# Patient Record
Sex: FEMALE | Race: WHITE | NOT HISPANIC OR LATINO | Employment: PART TIME | URBAN - NONMETROPOLITAN AREA
[De-identification: names, ages, dates, MRNs, and addresses within clinical notes are randomized per-mention and may not be internally consistent; named-entity substitution may affect disease eponyms.]

---
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
## 2020-01-17 ENCOUNTER — HOSPITAL ENCOUNTER (OUTPATIENT)
Dept: OTHER | Facility: HOSPITAL | Age: 20
Discharge: HOME | End: 2020-01-17
Attending: NURSE PRACTITIONER

## 2020-01-17 ENCOUNTER — OFFICE VISIT CONVERTED (OUTPATIENT)
Dept: FAMILY MEDICINE CLINIC | Age: 20
End: 2020-01-17
Attending: NURSE PRACTITIONER

## 2020-01-19 LAB — BACTERIA SPEC AEROBE CULT: NORMAL

## 2020-03-04 ENCOUNTER — OFFICE VISIT CONVERTED (OUTPATIENT)
Dept: OTOLARYNGOLOGY | Facility: CLINIC | Age: 20
End: 2020-03-04
Attending: OTOLARYNGOLOGY

## 2020-03-04 ENCOUNTER — CONVERSION ENCOUNTER (OUTPATIENT)
Dept: OTOLARYNGOLOGY | Facility: CLINIC | Age: 20
End: 2020-03-04

## 2020-09-15 ENCOUNTER — OFFICE VISIT CONVERTED (OUTPATIENT)
Dept: FAMILY MEDICINE CLINIC | Age: 20
End: 2020-09-15
Attending: NURSE PRACTITIONER

## 2020-09-15 ENCOUNTER — HOSPITAL ENCOUNTER (OUTPATIENT)
Dept: OTHER | Facility: HOSPITAL | Age: 20
Discharge: HOME | End: 2020-09-15
Attending: NURSE PRACTITIONER

## 2020-09-16 ENCOUNTER — APPOINTMENT (OUTPATIENT)
Dept: ULTRASOUND IMAGING | Facility: HOSPITAL | Age: 20
End: 2020-09-16

## 2020-09-16 ENCOUNTER — HOSPITAL ENCOUNTER (EMERGENCY)
Facility: HOSPITAL | Age: 20
Discharge: HOME | End: 2020-09-16
Attending: EMERGENCY MEDICINE | Admitting: EMERGENCY MEDICINE

## 2020-09-16 VITALS
OXYGEN SATURATION: 99 % | HEIGHT: 71 IN | SYSTOLIC BLOOD PRESSURE: 131 MMHG | RESPIRATION RATE: 16 BRPM | HEART RATE: 88 BPM | DIASTOLIC BLOOD PRESSURE: 92 MMHG | TEMPERATURE: 97.9 F

## 2020-09-16 DIAGNOSIS — R10.13 EPIGASTRIC ABDOMINAL PAIN: Primary | ICD-10-CM

## 2020-09-16 DIAGNOSIS — K29.00 ACUTE GASTRITIS WITHOUT HEMORRHAGE, UNSPECIFIED GASTRITIS TYPE: ICD-10-CM

## 2020-09-16 DIAGNOSIS — R93.89 ABNORMAL FINDING ON IMAGING: ICD-10-CM

## 2020-09-16 LAB
ALBUMIN SERPL-MCNC: 4.3 G/DL (ref 3.5–5.2)
ALBUMIN/GLOB SERPL: 1.5 G/DL
ALP SERPL-CCNC: 75 U/L (ref 39–117)
ALT SERPL W P-5'-P-CCNC: 14 U/L (ref 1–33)
ANION GAP SERPL CALCULATED.3IONS-SCNC: 8.7 MMOL/L (ref 5–15)
AST SERPL-CCNC: 18 U/L (ref 1–32)
BASOPHILS # BLD AUTO: 0.05 10*3/MM3 (ref 0–0.2)
BASOPHILS NFR BLD AUTO: 0.5 % (ref 0–1.5)
BILIRUB SERPL-MCNC: 0.4 MG/DL (ref 0–1.2)
BILIRUB UR QL STRIP: NEGATIVE
BUN SERPL-MCNC: 14 MG/DL (ref 6–20)
BUN/CREAT SERPL: 18.7 (ref 7–25)
CALCIUM SPEC-SCNC: 9.2 MG/DL (ref 8.6–10.5)
CHLORIDE SERPL-SCNC: 101 MMOL/L (ref 98–107)
CLARITY UR: CLEAR
CO2 SERPL-SCNC: 25.3 MMOL/L (ref 22–29)
COLOR UR: YELLOW
CREAT SERPL-MCNC: 0.75 MG/DL (ref 0.57–1)
DEPRECATED RDW RBC AUTO: 38.2 FL (ref 37–54)
EOSINOPHIL # BLD AUTO: 0.11 10*3/MM3 (ref 0–0.4)
EOSINOPHIL NFR BLD AUTO: 1 % (ref 0.3–6.2)
ERYTHROCYTE [DISTWIDTH] IN BLOOD BY AUTOMATED COUNT: 12.2 % (ref 12.3–15.4)
GFR SERPL CREATININE-BSD FRML MDRD: 99 ML/MIN/1.73
GLOBULIN UR ELPH-MCNC: 2.9 GM/DL
GLUCOSE SERPL-MCNC: 94 MG/DL (ref 65–99)
GLUCOSE UR STRIP-MCNC: NEGATIVE MG/DL
HCG SERPL QL: NEGATIVE
HCT VFR BLD AUTO: 40.5 % (ref 34–46.6)
HGB BLD-MCNC: 13.4 G/DL (ref 12–15.9)
HGB UR QL STRIP.AUTO: NEGATIVE
IMM GRANULOCYTES # BLD AUTO: 0.02 10*3/MM3 (ref 0–0.05)
IMM GRANULOCYTES NFR BLD AUTO: 0.2 % (ref 0–0.5)
KETONES UR QL STRIP: ABNORMAL
LEUKOCYTE ESTERASE UR QL STRIP.AUTO: NEGATIVE
LIPASE SERPL-CCNC: 12 U/L (ref 13–60)
LYMPHOCYTES # BLD AUTO: 2.04 10*3/MM3 (ref 0.7–3.1)
LYMPHOCYTES NFR BLD AUTO: 18.8 % (ref 19.6–45.3)
MCH RBC QN AUTO: 28.8 PG (ref 26.6–33)
MCHC RBC AUTO-ENTMCNC: 33.1 G/DL (ref 31.5–35.7)
MCV RBC AUTO: 87.1 FL (ref 79–97)
MONOCYTES # BLD AUTO: 0.51 10*3/MM3 (ref 0.1–0.9)
MONOCYTES NFR BLD AUTO: 4.7 % (ref 5–12)
NEUTROPHILS NFR BLD AUTO: 74.8 % (ref 42.7–76)
NEUTROPHILS NFR BLD AUTO: 8.1 10*3/MM3 (ref 1.7–7)
NITRITE UR QL STRIP: NEGATIVE
NRBC BLD AUTO-RTO: 0 /100 WBC (ref 0–0.2)
PH UR STRIP.AUTO: 5.5 [PH] (ref 5–8)
PLATELET # BLD AUTO: 225 10*3/MM3 (ref 140–450)
PMV BLD AUTO: 9.9 FL (ref 6–12)
POTASSIUM SERPL-SCNC: 3.8 MMOL/L (ref 3.5–5.2)
PROT SERPL-MCNC: 7.2 G/DL (ref 6–8.5)
PROT UR QL STRIP: NEGATIVE
RBC # BLD AUTO: 4.65 10*6/MM3 (ref 3.77–5.28)
SODIUM SERPL-SCNC: 135 MMOL/L (ref 136–145)
SP GR UR STRIP: 1.01 (ref 1–1.03)
UROBILINOGEN UR QL STRIP: ABNORMAL
WBC # BLD AUTO: 10.83 10*3/MM3 (ref 3.4–10.8)

## 2020-09-16 PROCEDURE — 76705 ECHO EXAM OF ABDOMEN: CPT

## 2020-09-16 PROCEDURE — 80053 COMPREHEN METABOLIC PANEL: CPT | Performed by: NURSE PRACTITIONER

## 2020-09-16 PROCEDURE — 85025 COMPLETE CBC W/AUTO DIFF WBC: CPT | Performed by: NURSE PRACTITIONER

## 2020-09-16 PROCEDURE — 81003 URINALYSIS AUTO W/O SCOPE: CPT | Performed by: NURSE PRACTITIONER

## 2020-09-16 PROCEDURE — 99284 EMERGENCY DEPT VISIT MOD MDM: CPT

## 2020-09-16 PROCEDURE — 83690 ASSAY OF LIPASE: CPT | Performed by: NURSE PRACTITIONER

## 2020-09-16 PROCEDURE — 84703 CHORIONIC GONADOTROPIN ASSAY: CPT | Performed by: NURSE PRACTITIONER

## 2020-09-16 RX ORDER — OMEPRAZOLE 40 MG/1
40 CAPSULE, DELAYED RELEASE ORAL DAILY
Qty: 30 CAPSULE | Refills: 0 | Status: SHIPPED | OUTPATIENT
Start: 2020-09-16 | End: 2020-10-16

## 2020-09-16 RX ORDER — SUCRALFATE 1 G/1
1 TABLET ORAL 4 TIMES DAILY
Qty: 120 TABLET | Refills: 0 | Status: SHIPPED | OUTPATIENT
Start: 2020-09-16 | End: 2020-10-16

## 2020-09-16 RX ORDER — ALUMINA, MAGNESIA, AND SIMETHICONE 2400; 2400; 240 MG/30ML; MG/30ML; MG/30ML
15 SUSPENSION ORAL ONCE
Status: COMPLETED | OUTPATIENT
Start: 2020-09-16 | End: 2020-09-16

## 2020-09-16 RX ORDER — SODIUM CHLORIDE 0.9 % (FLUSH) 0.9 %
10 SYRINGE (ML) INJECTION AS NEEDED
Status: DISCONTINUED | OUTPATIENT
Start: 2020-09-16 | End: 2020-09-16 | Stop reason: HOSPADM

## 2020-09-16 RX ORDER — LIDOCAINE HYDROCHLORIDE 20 MG/ML
15 SOLUTION OROPHARYNGEAL ONCE
Status: COMPLETED | OUTPATIENT
Start: 2020-09-16 | End: 2020-09-16

## 2020-09-16 RX ADMIN — ALUMINUM HYDROXIDE, MAGNESIUM HYDROXIDE, AND DIMETHICONE 15 ML: 400; 400; 40 SUSPENSION ORAL at 07:40

## 2020-09-16 RX ADMIN — LIDOCAINE HYDROCHLORIDE 15 ML: 20 SOLUTION ORAL; TOPICAL at 07:40

## 2020-09-16 NOTE — ED PROVIDER NOTES
Subjective   20-year-old female with no significant past medical history here for chief complaint of epigastric abdominal pain.  History was obtained from the patient.  The patient states that her pain started 2 weeks ago.  She states that a week ago it did get better.  She states that then it came back.  She states that she has nonbilious nonbloody vomiting.  Patient denies any radiation of pain.  Patient denies any diarrhea.  She denies any history of abdominal surgeries.  Patient denies any urinary symptoms.  She denies any vaginal bleeding or discharge.  Patient denies any chest pain or shortness of breath.  She denies any fevers or chills.  Patient states that she does have acid reflux.  She states that she has been drinking a lot of Coke and coffee that she has cut back on.  Patient states that for the past week she is only been drinking water.  She is currently on birth control.  Patient denies any swelling in her legs.          Review of Systems   All other systems reviewed and are negative.      History reviewed. No pertinent past medical history.    No Known Allergies    History reviewed. No pertinent surgical history.    History reviewed. No pertinent family history.    Social History     Socioeconomic History   • Marital status: Single     Spouse name: Not on file   • Number of children: Not on file   • Years of education: Not on file   • Highest education level: Not on file           Objective   Physical Exam  Vitals signs reviewed.   Constitutional:       General: She is not in acute distress.     Appearance: She is well-developed. She is not ill-appearing, toxic-appearing or diaphoretic.   HENT:      Head: Normocephalic and atraumatic.      Mouth/Throat:      Mouth: Mucous membranes are moist.      Pharynx: Oropharynx is clear. No pharyngeal swelling or oropharyngeal exudate.   Eyes:      General: No scleral icterus.     Extraocular Movements: Extraocular movements intact.      Pupils: Pupils are  equal, round, and reactive to light.   Cardiovascular:      Rate and Rhythm: Normal rate and regular rhythm.      Heart sounds: Normal heart sounds. No murmur. No friction rub. No gallop.    Pulmonary:      Effort: Pulmonary effort is normal. No respiratory distress.      Breath sounds: Normal breath sounds. No stridor. No wheezing, rhonchi or rales.   Chest:      Chest wall: No tenderness.   Abdominal:      General: Abdomen is flat. Bowel sounds are normal. There is no distension. There are no signs of injury.      Palpations: Abdomen is soft. There is no shifting dullness, fluid wave, hepatomegaly, splenomegaly, mass or pulsatile mass.      Tenderness: There is no abdominal tenderness. There is no right CVA tenderness, left CVA tenderness, guarding or rebound. Negative signs include Ferreira's sign, Rovsing's sign, McBurney's sign, psoas sign and obturator sign.      Hernia: No hernia is present.      Comments: No right lower quadrant abdominal pain, no left lower quadrant abdominal pain   Skin:     General: Skin is warm and dry.      Capillary Refill: Capillary refill takes less than 2 seconds.      Coloration: Skin is not cyanotic, jaundiced, mottled or pale.      Findings: No erythema or rash.   Neurological:      General: No focal deficit present.      Mental Status: She is alert and oriented to person, place, and time.      Cranial Nerves: No cranial nerve deficit.      Motor: No weakness.   Psychiatric:         Mood and Affect: Mood normal.         Procedures           ED Course  ED Course as of Sep 16 1706   Wed Sep 16, 2020   0659 Discussed patient with Dr. Lopez.     [EW]   0708 Discussed patient with Toshia in US.  Pt with minimal gallbladder sludge, otherwise unremarkable.  She also has incidental 1.3 cm hypoechoic area in left lobe of liver.     [EW]   0732 Discussed GB ultrasound with Dr. Angel.  Minimal sludge.  Left liver lesion is 12x7 mm, recommends follow up ultrasound in 3-4 months.   Could be  adenoma due to OCP.     [EW]   0736 I updated patient and her father on GB ultrasound results and specifically mentioned the left upper liver lobe lesion and the need for follow-up.  Will d/c her with increased dose of carafate, omeprazole and GI follow up.     [EW]   0801 Liver enzymes are normal, pregnancy negative, lipase normal.      [EW]      ED Course User Index  [EW] Cristiano Goldie Jasmin, APRN                                           MDM  Number of Diagnoses or Management Options  Abnormal finding on imaging:   Acute gastritis without hemorrhage, unspecified gastritis type:   Epigastric abdominal pain:      Amount and/or Complexity of Data Reviewed  Clinical lab tests: ordered and reviewed  Tests in the radiology section of CPT®: ordered and reviewed    Risk of Complications, Morbidity, and/or Mortality  General comments: When I first saw the patient, the patient appeared nontoxic.  IV was started and labs were obtained.  Patient's labs were grossly unremarkable.  At this point, given that she was having epigastric abdominal pain, we decided to get a right upper quadrant ultrasound.  This was negative for an acute gallbladder pathology.  Patient did have minimal sludge and incidental finding on the liver lobe.  The patient was made aware of this.  She was told that she will need repeat imaging to follow this up.  Patient had negative Ferreira sign on my physical exam.  Patient did not have any jaundice on my physical exam.  I suspicion for acute cholecystitis is low.  Patient's lipase was negative making pancreatitis less likely.  I think appendicitis is less likely given that the patient had no right lower quadrant abdominal pain and her symptoms did resolve and have been going on and off for approximately 2 weeks.  We did talk about a CT abdomen pelvis and a joint decision making was applied given that the patient's abdominal exam was so benign and the risks of radiation that the patient decided that she would  want a wait on the CT scan today.  She did understand the risks and benefits of getting the CT scan.  She had capacity to make decisions and decided to wait.  Patient was given a GI cocktail in the emergency department.  I think GYN pathology is less likely given that the patient had no bilateral lower quadrant pain and no complaints of vaginal bleeding or discharge.  I think pyelonephritis or kidney stone is less likely given that the patient had negative urine.  At this point, I think patient symptoms could be secondary to acid reflux.  Patient was given a prescription for omeprazole, was told to increase her dose of Carafate, and was told to follow-up with GI as an outpatient.  Patient was given strict return precautions including any abdominal pain, nausea, vomiting, blood in her stool, blood in her vomit, fevers, or any other concerning symptoms.  Patient expressed understanding and was glad to be discharged home.  All questions were answered prior to discharge.    Patient Progress  Patient progress: stable      Final diagnoses:   None            Kashif Lopez MD  09/16/20 5870

## 2020-09-16 NOTE — DISCHARGE INSTRUCTIONS
Incidental Liver Nodule    Your imaging study today revealed an incidental finding of one or more nodules in your liver.  These do not appear to be related to your medical complaint today, however the finding does warrant further evaluation.  The exact cause and significance of the nodule(s) is unknown at this amarilis, however the possible diagnoses do include infections, tumors, and/or cancers.  The fact that you are being discharged today implies that I feel serious complication is unlikely, however additional testing that is not availabe in the ER may be required.      Please follow up with your Primary Care Provider and make sure they are aware of this finding so that he/she can schedule the appropriate follow-up testing.      It has been recommended you have follow up US in 3-4 months to evaluate resolution    Although you are being discharged from the ED today, I encourage you to return for worsening symptoms. Things can, and do, change such that treatment at home with medication may not be adequate. Specifically I recommend returning for chest pain or discomfort, difficulty breathing, persistent vomiting or difficulty holding down liquids or medications, fever > 102.0 F, severe abdominal pain, or any other worsening or alarming symptoms.     Rest. Drink plenty of fluids.  Follow up with PCP or provider listed for further evaluation and management.  Follow up with primary care provider for further management and to have blood pressure rechecked.  Take all medications as prescribed.

## 2020-09-16 NOTE — ED NOTES
Patient was placed in face mask in first look.  Patient was wearing a face mask throughout our encounter.  I wore protective eye protection throughout the encounter.  Hand hygiene was performed before and after patient encounter.        Armani Weaver RN  09/16/20 0775

## 2020-09-16 NOTE — ED TRIAGE NOTES
Pt reports upper abdominal pains rating 4/10 at the moment. Pt reports that the pains are intermittent. Pt states that she has had nausea and fatigue as well with her current symptoms.     Pt placed in mask and staff wearing appropriate ppe at the time of pt arrival.

## 2020-09-16 NOTE — ED PROVIDER NOTES
EMERGENCY DEPARTMENT ENCOUNTER    Room Number:  06/06  Date seen:  9/16/2020  Time seen: 06:12 EDT  PCP: Elayne Sprague APRN  Historian: patient    HPI:  Chief complaint:RUQ pain, n/v  A complete HPI/ROS/PMH/PSH/SH/FH are unobtainable due to: n/a  Context:Lin Jimenez is a 20 y.o. female who presents to the ED with c/o 1 week intermittent RUQ pain described as aching which changes to sharp pain with nausea.  It is made only slightly better with Pepto bismol but not worse by anything. The pain radiates to her right back.  Pain has been present for the past week and she has been drinking only water and eating soup/toast.  She came to ER this am because she had n/v when she had the pain.  She saw PCP yesterday who dx as ulcer and began her on Carafate BID.  She has no fever/chills.  No h/o abdominal surgeries.  She is on BCP and her LMP was about 3 weeks ago.  She reports similar symptoms one month ago that resolved and now it has returned.     Patient was placed in face mask in first look. Patient was wearing facemask when I entered the room and throughout our encounter. I wore full protective equipment throughout this patient encounter including a face mask, eye shield and gloves. Hand hygiene/washing of hands was performed before donning protective equipment and after removal when leaving the room.    MEDICAL RECORD REVIEW    ALLERGIES  Patient has no known allergies.    PAST MEDICAL HISTORY  Active Ambulatory Problems     Diagnosis Date Noted   • No Active Ambulatory Problems     Resolved Ambulatory Problems     Diagnosis Date Noted   • No Resolved Ambulatory Problems     No Additional Past Medical History       PAST SURGICAL HISTORY  History reviewed. No pertinent surgical history.    FAMILY HISTORY  History reviewed. No pertinent family history.    SOCIAL HISTORY  Social History     Socioeconomic History   • Marital status: Single     Spouse name: Not on file   • Number of children: Not on file   • Years of  education: Not on file   • Highest education level: Not on file       REVIEW OF SYSTEMS  Review of Systems    All systems reviewed and negative except for those discussed in HPI.     PHYSICAL EXAM    ED Triage Vitals [09/16/20 0601]   Temp Heart Rate Resp BP SpO2   97.9 °F (36.6 °C) 102 15 -- 99 %      Temp src Heart Rate Source Patient Position BP Location FiO2 (%)   Tympanic Monitor -- -- --     Physical Exam    I have reviewed the triage vital signs and nursing notes.      GENERAL: not distressed, calm, pleasant, cooperative  HENT: nares patent, mm moist  EYES: no scleral icterus  NECK: no ROM limitations  CV: regular rhythm, regular rate, no murmur, no rubs, no gallups  RESPIRATORY: normal effort, CTAB  ABDOMEN: soft, flat.  Minimal RUQ and epigastric tenderness, no rebound or guarding.  Bowel sounds normal  : deferred  MUSCULOSKELETAL: no deformity  NEURO: alert, moves all extremities, follows commands  SKIN: warm, dry    LAB RESULTS  Recent Results (from the past 24 hour(s))   Comprehensive Metabolic Panel    Collection Time: 09/16/20  7:09 AM    Specimen: Blood   Result Value Ref Range    Glucose 94 65 - 99 mg/dL    BUN 14 6 - 20 mg/dL    Creatinine 0.75 0.57 - 1.00 mg/dL    Sodium 135 (L) 136 - 145 mmol/L    Potassium 3.8 3.5 - 5.2 mmol/L    Chloride 101 98 - 107 mmol/L    CO2 25.3 22.0 - 29.0 mmol/L    Calcium 9.2 8.6 - 10.5 mg/dL    Total Protein 7.2 6.0 - 8.5 g/dL    Albumin 4.30 3.50 - 5.20 g/dL    ALT (SGPT) 14 1 - 33 U/L    AST (SGOT) 18 1 - 32 U/L    Alkaline Phosphatase 75 39 - 117 U/L    Total Bilirubin 0.4 0.0 - 1.2 mg/dL    eGFR Non African Amer 99 >60 mL/min/1.73    Globulin 2.9 gm/dL    A/G Ratio 1.5 g/dL    BUN/Creatinine Ratio 18.7 7.0 - 25.0    Anion Gap 8.7 5.0 - 15.0 mmol/L   hCG, Serum, Qualitative    Collection Time: 09/16/20  7:09 AM    Specimen: Blood   Result Value Ref Range    HCG Qualitative Negative Negative   CBC Auto Differential    Collection Time: 09/16/20  7:09 AM     Specimen: Blood   Result Value Ref Range    WBC 10.83 (H) 3.40 - 10.80 10*3/mm3    RBC 4.65 3.77 - 5.28 10*6/mm3    Hemoglobin 13.4 12.0 - 15.9 g/dL    Hematocrit 40.5 34.0 - 46.6 %    MCV 87.1 79.0 - 97.0 fL    MCH 28.8 26.6 - 33.0 pg    MCHC 33.1 31.5 - 35.7 g/dL    RDW 12.2 (L) 12.3 - 15.4 %    RDW-SD 38.2 37.0 - 54.0 fl    MPV 9.9 6.0 - 12.0 fL    Platelets 225 140 - 450 10*3/mm3    Neutrophil % 74.8 42.7 - 76.0 %    Lymphocyte % 18.8 (L) 19.6 - 45.3 %    Monocyte % 4.7 (L) 5.0 - 12.0 %    Eosinophil % 1.0 0.3 - 6.2 %    Basophil % 0.5 0.0 - 1.5 %    Immature Grans % 0.2 0.0 - 0.5 %    Neutrophils, Absolute 8.10 (H) 1.70 - 7.00 10*3/mm3    Lymphocytes, Absolute 2.04 0.70 - 3.10 10*3/mm3    Monocytes, Absolute 0.51 0.10 - 0.90 10*3/mm3    Eosinophils, Absolute 0.11 0.00 - 0.40 10*3/mm3    Basophils, Absolute 0.05 0.00 - 0.20 10*3/mm3    Immature Grans, Absolute 0.02 0.00 - 0.05 10*3/mm3    nRBC 0.0 0.0 - 0.2 /100 WBC   Lipase    Collection Time: 09/16/20  7:09 AM    Specimen: Blood   Result Value Ref Range    Lipase 12 (L) 13 - 60 U/L   Urinalysis With Microscopic If Indicated (No Culture) - Urine, Clean Catch    Collection Time: 09/16/20  7:16 AM    Specimen: Urine, Clean Catch   Result Value Ref Range    Color, UA Yellow Yellow, Straw    Appearance, UA Clear Clear    pH, UA 5.5 5.0 - 8.0    Specific Gravity, UA 1.013 1.005 - 1.030    Glucose, UA Negative Negative    Ketones, UA Trace (A) Negative    Bilirubin, UA Negative Negative    Blood, UA Negative Negative    Protein, UA Negative Negative    Leuk Esterase, UA Negative Negative    Nitrite, UA Negative Negative    Urobilinogen, UA 0.2 E.U./dL 0.2 - 1.0 E.U./dL         RADIOLOGY RESULTS  US Gallbladder               PROGRESS, DATA ANALYSIS, CONSULTS AND MEDICAL DECISION MAKING  All labs have been independently reviewed by me.  All radiology studies have been reviewed by me and discussed with radiologist dictating the report.  EKG's independently viewed and  interpreted by me unless stated otherwise. Discussion below represents my analysis of pertinent findings related to patient's condition, differential diagnosis, treatment plan and final disposition.     ED Course as of Sep 16 0805   Wed Sep 16, 2020   0659 Discussed patient with Dr. Lopez.     [EW]   0708 Discussed patient with Toshia in US.  Pt with minimal gallbladder sludge, otherwise unremarkable.  She also has incidental 1.3 cm hypoechoic area in left lobe of liver.     [EW]   0732 Discussed GB ultrasound with Dr. Angel.  Minimal sludge.  Left liver lesion is 12x7 mm, recommends follow up ultrasound in 3-4 months.   Could be adenoma due to OCP.     [EW]   0736 I updated patient and her father on GB ultrasound results and specifically mentioned the left upper liver lobe lesion and the need for follow-up.  Will d/c her with increased dose of carafate, omeprazole and GI follow up.     [EW]   0801 Liver enzymes are normal, pregnancy negative, lipase normal.      [EW]      ED Course User Index  [EW] Goldie Quinonez, APRN     DDX: Differential diagnosis includes but is not limited to:  - hepatobiliary pathology such as cholecystitis, cholangitis, and symptomatic cholelithiasis  - Pancreatitis  - Dyspepsia  - Small bowel obstruction  - Appendicitis  - Diverticulitis  - UTI including pyelonephritis  - Ureteral stone  - Zoster  - Colitis, including infectious and ischemic  - Atypical ACS    MDM: Patient with normal labs.  Gallbladder ultrasound is essentially unremarkable.  I discussed with patient incidental finding of the liver abnormality and discussed follow-up with her.  I have given her GI follow-up, increased her Carafate to 4 times a day and started her on omeprazole.  Her abdominal exam is nonfocal and she appears well.    0700:Reviewed pt's history and workup with Dr. Lopez.  After a bedside evaluation, Dr. Lopez agrees with the plan of care.    The patient's history, physical exam, and lab findings were  "discussed with the physician, who also performed a face to face history and physical exam.  I discussed all results and noted any abnormalities with patient.  Discussed absoute need to recheck abnormalities with their family physician.  I answered any of the patient's questions.  Discussed plan for discharge, as there is no emergent indication for admission.  Pt is agreeable and understands need for follow up and repeat testing.  Pt is aware that discharge does not mean that nothing is wrong but it indicates no emergency is present and they must continue care with their family physician.  Pt is discharged with instructions to follow up with primary care doctor to have their blood pressure rechecked.           Disposition vitals:  /85   Pulse 88   Temp 97.9 °F (36.6 °C) (Tympanic)   Resp 18   Ht 180.3 cm (71\")   SpO2 99%       DIAGNOSIS  Final diagnoses:   Epigastric abdominal pain   Abnormal finding on imaging   Acute gastritis without hemorrhage, unspecified gastritis type       FOLLOW UP   Cornelio Ford MD  [address]  [place] [id number] [phone]    Schedule an appointment as soon as possible for a visit            Goldie Quinonez, TRACIE  09/16/20 0806    "

## 2020-09-17 ENCOUNTER — PREP FOR SURGERY (OUTPATIENT)
Dept: OTHER | Facility: HOSPITAL | Age: 20
End: 2020-09-17

## 2020-09-17 ENCOUNTER — OFFICE VISIT (OUTPATIENT)
Dept: GASTROENTEROLOGY | Facility: CLINIC | Age: 20
End: 2020-09-17

## 2020-09-17 VITALS
WEIGHT: 165 LBS | OXYGEN SATURATION: 99 % | TEMPERATURE: 98 F | SYSTOLIC BLOOD PRESSURE: 110 MMHG | HEIGHT: 71 IN | DIASTOLIC BLOOD PRESSURE: 78 MMHG | BODY MASS INDEX: 23.1 KG/M2 | HEART RATE: 89 BPM

## 2020-09-17 DIAGNOSIS — R11.0 NAUSEA: ICD-10-CM

## 2020-09-17 DIAGNOSIS — R10.11 RIGHT UPPER QUADRANT ABDOMINAL PAIN: ICD-10-CM

## 2020-09-17 DIAGNOSIS — R10.13 EPIGASTRIC PAIN: Primary | ICD-10-CM

## 2020-09-17 DIAGNOSIS — K76.9 LIVER LESION: ICD-10-CM

## 2020-09-17 PROCEDURE — 99204 OFFICE O/P NEW MOD 45 MIN: CPT | Performed by: INTERNAL MEDICINE

## 2020-09-17 RX ORDER — NORGESTREL AND ETHINYL ESTRADIOL 0.3-0.03MG
KIT ORAL
COMMUNITY
Start: 2020-09-03 | End: 2022-11-22

## 2020-09-17 NOTE — PATIENT INSTRUCTIONS
Schedule EGD for further evaluation of symptoms.    For surveillance of liver lesion, we will plan on ultrasound for monitoring December 2020.

## 2020-09-17 NOTE — PROGRESS NOTES
Abdominal Pain (For a month ), Vomiting, and Hospital Follow Up Visit      HPI  Patient here for consultation regarding epigastric pain and right upper quadrant pain.  She reported a 2-week history of abdominal pain associated with nonbilious, nonbloody emesis.  There is no reported diarrhea.  Patient had been complaining of acid reflux but no dysphagia.    She was recently in the emergency room and was sent home on omeprazole and Carafate.  She had an ultrasound in the ER which showed evidence of minimal gallbladder sludge and a 12 mm x 7 mm left hepatic lesion and a follow-up was recommended in 3 to 4 months. Lipase was normal.    Patient presents today with concerns about abdominal pain. Pain is present to epigastric area and RUQ. Pain has been present for months. At times, it is achy but it became sharp before her recent ER visit. When the pain is sharp, it radiates to her back. She also had vomiting associated with the pain. She has been following a bland diet.    She takes ibuprofen occasionally.     Denies prior GI surgeries. Her grandfather has Crohn's disease.    She was started on omeprazole and carafate which have helped her symptoms somewhat.    Review of Systems   Constitutional: Positive for appetite change and fatigue. Negative for chills, diaphoresis, fever and unexpected weight change.   HENT: Negative for dental problem, ear pain, mouth sores, rhinorrhea, sore throat and voice change.    Eyes: Negative for pain, redness and visual disturbance.   Respiratory: Negative for cough, chest tightness and wheezing.    Cardiovascular: Negative for chest pain, palpitations and leg swelling.   Endocrine: Negative for cold intolerance, heat intolerance, polydipsia, polyphagia and polyuria.   Genitourinary: Negative for dysuria, frequency, hematuria and urgency.   Musculoskeletal: Positive for back pain. Negative for arthralgias, joint swelling, myalgias and neck pain.   Skin: Negative for rash.    Allergic/Immunologic: Negative for environmental allergies, food allergies and immunocompromised state.   Neurological: Positive for weakness and headaches. Negative for dizziness, seizures and numbness.   Hematological: Does not bruise/bleed easily.   Psychiatric/Behavioral: Negative for sleep disturbance. The patient is not nervous/anxious.         I have reviewed and confirmed the accuracy of the HPI and ROS as documented by the APRN TRACIE Bartholomew     Problem List:  There is no problem list on file for this patient.      Medical History:  No past medical history on file.     Social History:    Social History     Socioeconomic History   • Marital status: Single     Spouse name: Not on file   • Number of children: Not on file   • Years of education: Not on file   • Highest education level: Not on file   Tobacco Use   • Smoking status: Never Smoker   • Smokeless tobacco: Never Used   Substance and Sexual Activity   • Alcohol use: Not Currently   • Drug use: Never   • Sexual activity: Defer       Family History:   Family History   Problem Relation Age of Onset   • Hypertension Neg Hx    • Diabetes Neg Hx    • Crohn's disease Neg Hx        Surgical History: No past surgical history on file.      Current Outpatient Medications:   •  Low-Ogestrel 0.3-30 MG-MCG per tablet, , Disp: , Rfl:   •  omeprazole (priLOSEC) 40 MG capsule, Take 1 capsule by mouth Daily for 30 days., Disp: 30 capsule, Rfl: 0  •  sucralfate (CARAFATE) 1 g tablet, Take 1 tablet by mouth 4 (Four) Times a Day for 30 days., Disp: 120 tablet, Rfl: 0    Allergies: No Known Allergies     The following portions of the patient's history were reviewed and updated as appropriate: allergies, current medications, past family history, past medical history, past social history, past surgical history and problem list.    Vitals:    09/17/20 1342   BP: 110/78   Pulse: 89   Temp: 98 °F (36.7 °C)   SpO2: 99%         09/17/20  1342   Weight: 74.8 kg (165 lb)      Body mass index is 23.02 kg/m².      PHYSICAL EXAM:  Physical Exam  Vitals signs reviewed.   Constitutional:       Appearance: Normal appearance. She is well-developed.   HENT:      Nose: Nose normal. No nasal deformity.   Eyes:      General: No scleral icterus.  Neck:      Trachea: No tracheal deviation.   Pulmonary:      Effort: Pulmonary effort is normal. No respiratory distress.      Breath sounds: Normal breath sounds.   Abdominal:      General: Bowel sounds are normal. There is no distension.      Palpations: Abdomen is soft. Abdomen is not rigid. There is no shifting dullness.      Tenderness: There is no abdominal tenderness. There is no guarding or rebound.      Hernia: No hernia is present.   Lymphadenopathy:      Comments: No periumbilical lymphadenopathy   Skin:     General: Skin is warm.   Neurological:      Mental Status: She is alert.   Psychiatric:         Behavior: Behavior normal.             Assessment/ Plan  Lin was seen today for abdominal pain, vomiting and hospital follow up visit.    Diagnoses and all orders for this visit:    Epigastric pain    Right upper quadrant abdominal pain    Nausea    Liver lesion         No follow-ups on file.    There are no Patient Instructions on file for this visit.        Discussion:  We will proceed with an EGD for evaluation of new and worsening epigastric pain associated with nausea.  We will take biopsies of the small bowel to rule out celiac disease.  In the meantime continue PPI and Carafate.  Plan repeat ultrasound of the liver in 3 months to assess stability of liver lesion.  Has enlarged, would consider dedicated CT or MRI.  Patient is going to talk to her gynecologist regarding decreased estrogen content in her oral contraceptives.    Documentation by Alyce HODGES acting as a scribe in the following sections on behalf of the billable provider: HPI, ROS, assessment, & plan.

## 2020-10-05 ENCOUNTER — LAB REQUISITION (OUTPATIENT)
Dept: LAB | Facility: HOSPITAL | Age: 20
End: 2020-10-05

## 2020-10-05 DIAGNOSIS — Z00.00 ENCOUNTER FOR GENERAL ADULT MEDICAL EXAMINATION WITHOUT ABNORMAL FINDINGS: ICD-10-CM

## 2020-10-05 PROCEDURE — U0004 COV-19 TEST NON-CDC HGH THRU: HCPCS | Performed by: INTERNAL MEDICINE

## 2020-10-06 LAB — SARS-COV-2 RNA RESP QL NAA+PROBE: NOT DETECTED

## 2020-10-09 ENCOUNTER — OUTSIDE FACILITY SERVICE (OUTPATIENT)
Dept: GASTROENTEROLOGY | Facility: CLINIC | Age: 20
End: 2020-10-09

## 2020-10-09 PROCEDURE — 43239 EGD BIOPSY SINGLE/MULTIPLE: CPT | Performed by: INTERNAL MEDICINE

## 2020-12-14 ENCOUNTER — APPOINTMENT (OUTPATIENT)
Dept: ULTRASOUND IMAGING | Facility: HOSPITAL | Age: 20
End: 2020-12-14

## 2020-12-26 NOTE — PROGRESS NOTES
Follow-up and Abdominal Pain (Pt stated the pains have subsided )      HPI  20-year-old female presents the office today for follow-up.  She was last seen in office on 9/17/2020.  She has a history of epigastric pain.  EGD on 10/14/2020 which was unremarkable, aside from some acute angulation in the post bulbar duodenum.  She was recommended to undergo small bowel follow-through for further evaluation, but it does not appear that this has been completed.  Stomach biopsy revealed mild reactive gastropathy.  Small bowel biopsy was unremarkable.    She reports having approximately 2 episodes of abdominal discomfort accompanied with nausea in the past 3 months.  She reports experiencing sharp pains near her epigastric/rib cage area, that at times make it difficult for her to take a deep breath.  The abdominal discomfort generally lasts 2-3 weeks in total duration.  She might have an uncomfortable type of feeling all day, but at worst pain is a 7/10 and is sharp intermittently.  She reports Pepto-Bismol helps at times.  Symptoms seem to be worse when she is lying down.  Between episodes, she does not have any of the symptoms.  She denies any weight loss.  She reports early satiety, which occurs on a regular basis.  Generally she is only able to eat a few bites of food at a time and feels full.  She is not diabetic.    She reports having regular daily bowel movements and denies have any lower abdominal pain, diarrhea, constipation, melena, or hematochezia.    Review of Systems   Constitutional: Negative for appetite change, chills, diaphoresis, fatigue, fever and unexpected weight change.   HENT: Negative for dental problem, ear pain, mouth sores, rhinorrhea, sore throat and voice change.    Eyes: Negative for pain, redness and visual disturbance.   Respiratory: Negative for cough, chest tightness and wheezing.    Cardiovascular: Negative for chest pain, palpitations and leg swelling.   Endocrine: Negative for cold  intolerance, heat intolerance, polydipsia, polyphagia and polyuria.   Genitourinary: Negative for dysuria, frequency, hematuria and urgency.   Musculoskeletal: Negative for arthralgias, back pain, joint swelling, myalgias and neck pain.   Skin: Negative for rash.   Allergic/Immunologic: Positive for environmental allergies. Negative for food allergies and immunocompromised state.   Neurological: Negative for dizziness, seizures, weakness, numbness and headaches.   Hematological: Does not bruise/bleed easily.   Psychiatric/Behavioral: Negative for sleep disturbance. The patient is not nervous/anxious.             Problem List:  There is no problem list on file for this patient.      Medical History:  History reviewed. No pertinent past medical history.     Social History:    Social History     Socioeconomic History   • Marital status: Single     Spouse name: Not on file   • Number of children: Not on file   • Years of education: Not on file   • Highest education level: Not on file   Tobacco Use   • Smoking status: Never Smoker   • Smokeless tobacco: Never Used   Substance and Sexual Activity   • Alcohol use: Not Currently   • Drug use: Never   • Sexual activity: Defer       Family History:   Family History   Problem Relation Age of Onset   • Hypertension Neg Hx    • Diabetes Neg Hx    • Crohn's disease Neg Hx        Surgical History: History reviewed. No pertinent surgical history.      Current Outpatient Medications:   •  Low-Ogestrel 0.3-30 MG-MCG per tablet, , Disp: , Rfl:   •  ondansetron ODT (ZOFRAN-ODT) 4 MG disintegrating tablet, Dissolve 1 tablet by mouth every 6 hours PRN nausea, vomiting, Disp: 30 tablet, Rfl: 1    Allergies: No Known Allergies     The following portions of the patient's history were reviewed and updated as appropriate: allergies, current medications, past family history, past medical history, past social history, past surgical history and problem list.    Vitals:    12/29/20 1323   BP:  118/64   Pulse: 80   Temp: 97.8 °F (36.6 °C)   SpO2: 98%       Physical Exam  Vitals signs reviewed.   Constitutional:       Appearance: Normal appearance.   Pulmonary:      Effort: Pulmonary effort is normal. No respiratory distress.   Abdominal:      General: Abdomen is flat. Bowel sounds are normal. There is no distension.      Palpations: Abdomen is soft.      Tenderness: There is no abdominal tenderness. There is no guarding or rebound.   Musculoskeletal: Normal range of motion.   Skin:     General: Skin is warm and dry.   Neurological:      General: No focal deficit present.      Mental Status: She is alert and oriented to person, place, and time.   Psychiatric:         Mood and Affect: Mood normal.         Behavior: Behavior normal.         Thought Content: Thought content normal.         Judgment: Judgment normal.         Assessment/ Plan  Diagnoses and all orders for this visit:    1. Abnormal findings on esophagogastroduodenoscopy (EGD) (Primary)  -     FL UGI Small Intestine Follow-through; Future    2. Epigastric pain  -     FL UGI Small Intestine Follow-through; Future    3. Nausea  -     FL UGI Small Intestine Follow-through; Future    4. Early satiety    Other orders  -     ondansetron ODT (ZOFRAN-ODT) 4 MG disintegrating tablet; Dissolve 1 tablet by mouth every 6 hours PRN nausea, vomiting  Dispense: 30 tablet; Refill: 1         Return for pending test results and symptoms.  1.  We will plan to proceed with small bowel follow-through for further evaluation of abnormal findings on EGD consisting of acute angulation of the post bulbar duodenum.    2.  For nausea, a prescription for Zofran 4 mg oral disintegrating tablets has been sent to your pharmacy.  You may take 1 tablet every 6 hours as needed for nausea.  Please note that Zofran can be constipating if taken at higher doses.    3.  Additional recommendations pending outcome of small bowel follow-through.      Discussion:  EGD and pathology report  reviewed with patient today during her office visit.  Due to findings on her EGD consisting of acute angulation of the post bulbar duodenum, we will proceed with small bowel follow-through for further evaluation for possible median arcuate ligament syndrome.  For nausea, we have prescribed Zofran.  If small bowel follow-through is unrevealing, to consider gastric emptying study for further evaluation of early satiety and nausea in the setting of intermittent abdominal discomfort.  Next office visit to be determined based upon imaging and patient symptoms.  Patient verbalized understanding of above plan of care.  All questions answered and support provided.

## 2020-12-29 ENCOUNTER — OFFICE VISIT (OUTPATIENT)
Dept: GASTROENTEROLOGY | Facility: CLINIC | Age: 20
End: 2020-12-29

## 2020-12-29 VITALS
SYSTOLIC BLOOD PRESSURE: 118 MMHG | HEIGHT: 71 IN | WEIGHT: 167.9 LBS | OXYGEN SATURATION: 98 % | HEART RATE: 80 BPM | DIASTOLIC BLOOD PRESSURE: 64 MMHG | BODY MASS INDEX: 23.51 KG/M2 | TEMPERATURE: 97.8 F

## 2020-12-29 DIAGNOSIS — R68.81 EARLY SATIETY: ICD-10-CM

## 2020-12-29 DIAGNOSIS — R19.8 ABNORMAL FINDINGS ON ESOPHAGOGASTRODUODENOSCOPY (EGD): Primary | ICD-10-CM

## 2020-12-29 DIAGNOSIS — R11.0 NAUSEA: ICD-10-CM

## 2020-12-29 DIAGNOSIS — R10.13 EPIGASTRIC PAIN: ICD-10-CM

## 2020-12-29 PROCEDURE — 99214 OFFICE O/P EST MOD 30 MIN: CPT | Performed by: NURSE PRACTITIONER

## 2020-12-29 RX ORDER — ONDANSETRON 4 MG/1
TABLET, ORALLY DISINTEGRATING ORAL
Qty: 30 TABLET | Refills: 1 | Status: SHIPPED | OUTPATIENT
Start: 2020-12-29 | End: 2022-06-06

## 2020-12-29 NOTE — PATIENT INSTRUCTIONS
1.  We will plan to proceed with small bowel follow-through for further evaluation of abnormal findings on EGD consisting of acute angulation of the post bulbar duodenum.    2.  For nausea, a prescription for Zofran 4 mg oral disintegrating tablets has been sent to your pharmacy.  You may take 1 tablet every 6 hours as needed for nausea.  Please note that Zofran can be constipating if taken at higher doses.    3.  Additional recommendations pending outcome of small bowel follow-through.

## 2020-12-31 ENCOUNTER — APPOINTMENT (OUTPATIENT)
Dept: ULTRASOUND IMAGING | Facility: HOSPITAL | Age: 20
End: 2020-12-31

## 2021-01-08 ENCOUNTER — HOSPITAL ENCOUNTER (OUTPATIENT)
Dept: OTHER | Facility: HOSPITAL | Age: 21
Discharge: HOME | End: 2021-01-08
Attending: FAMILY MEDICINE

## 2021-01-10 LAB — SARS-COV-2 RNA SPEC QL NAA+PROBE: NOT DETECTED

## 2021-04-15 ENCOUNTER — TRANSCRIBE ORDERS (OUTPATIENT)
Dept: ADMINISTRATIVE | Facility: HOSPITAL | Age: 21
End: 2021-04-15

## 2021-04-15 DIAGNOSIS — R10.9 ABDOMINAL PAIN, UNSPECIFIED ABDOMINAL LOCATION: Primary | ICD-10-CM

## 2021-04-15 DIAGNOSIS — K76.89 LIVER NODULE: ICD-10-CM

## 2021-04-20 ENCOUNTER — TELEPHONE (OUTPATIENT)
Dept: GASTROENTEROLOGY | Facility: CLINIC | Age: 21
End: 2021-04-20

## 2021-04-20 NOTE — TELEPHONE ENCOUNTER
Per verbal release, left detailed message about overdue small bowel follow through ordered by Petrona Mills.

## 2021-04-26 ENCOUNTER — HOSPITAL ENCOUNTER (OUTPATIENT)
Dept: ULTRASOUND IMAGING | Facility: HOSPITAL | Age: 21
Discharge: HOME | End: 2021-04-26
Admitting: OBSTETRICS & GYNECOLOGY

## 2021-04-26 DIAGNOSIS — R10.9 ABDOMINAL PAIN, UNSPECIFIED ABDOMINAL LOCATION: ICD-10-CM

## 2021-04-26 DIAGNOSIS — K76.89 LIVER NODULE: ICD-10-CM

## 2021-04-26 PROCEDURE — 76705 ECHO EXAM OF ABDOMEN: CPT

## 2021-05-15 VITALS
WEIGHT: 166.37 LBS | RESPIRATION RATE: 16 BRPM | TEMPERATURE: 97.8 F | BODY MASS INDEX: 23.29 KG/M2 | HEIGHT: 71 IN | HEART RATE: 90 BPM | OXYGEN SATURATION: 99 %

## 2021-05-18 NOTE — PROGRESS NOTES
Lin Jimenez  2000     Office/Outpatient Visit    Visit Date: Tue, Sep 15, 2020 11:24 am    Provider: Elayne Sprague N.P. (Assistant: Jeanine Purvis RN)    Location: [facility]        Electronically signed by Elayne Sprague N.P. on  09/15/2020 12:47:20 PM                             Subjective:        CC: Ms. Jimenez is a 20 year old female.  presents today due to Pain in stomach and acid reflux that is intermitten, the pain goes from stabbing to aching at times         HPI:           PHQ-9 Depression Screening: Completed form scanned and in chart; Total Score enter total score: 2           Unspecified abdominal pain details; this is located primarily in the epigastric region.  It began years ago.  The onset of pain occurred acts up more at night - wakes her up  out of sleep.  Aggravating factors include at night.  The pain is relieved with Peptobismol helps in the short term - having to take daily.  Associated symptoms include melena, nausea and severe heartburn.  She denies change in bowel habits, constipation, diarrhea or vomiting.  Habits include Drniks only socially  ;  does not take NSAIDs regularly.  Had an US about 2 years ago for similar symptoms,  GB was normal Had food last night 9 pm - had peptobismol at 11pm    ROS:     CONSTITUTIONAL:  Negative for chills and fever.      CARDIOVASCULAR:  Negative for chest pain and pedal edema.      RESPIRATORY:  Negative for recent cough and dyspnea.      GASTROINTESTINAL:  Positive for abdominal pain ( epigastric ), acid reflux symptoms, heartburn, melena ( noticed here recently ) and nausea.   Negative for abdominal bloating, constipation, diarrhea or vomiting.      MUSCULOSKELETAL:  Negative for back pain.          Past Medical History / Family History / Social History:         Last Reviewed on 9/20/2019 01:11 PM by Elayne Sprague    Past Medical History:             PAST MEDICAL HISTORY         Positive for     Allergies;         Surgical History:     NONE         Family History:     Father: Hypertension     Mother: Healthy     Brother(s): 1 brother(s) total     Paternal Grandmother: Hypertension;     Maternal Grandfather: Hypertension; Myocardial Infarction ( 50-60s );  Crohn's Disease     Maternal Grandmother: Breast Cancer ( Dx at age 50-60 )         Social History:     Occupation: [employer]. Student     Marital Status: Single     Children: None         Tobacco/Alcohol/Supplements:     Last Reviewed on 9/15/2020 11:27 AM by Jeanine Purvis    Tobacco: She has never smoked.  Non-drinker     Caffeine:  She admits to consuming caffeine via coffee.          Substance Abuse History:     Last Reviewed on 9/20/2019 01:11 PM by Elayne Sprague    None         Mental Health History:     Last Reviewed on 9/20/2019 01:11 PM by Elayne Sprague    NEGATIVE         Communicable Diseases (eg STDs):     Last Reviewed on 9/20/2019 01:11 PM by Elayne Sprague    Reportable health conditions; NEGATIVE         Current Problems:     Last Reviewed on 9/15/2020 12:45 PM by Elayne Sprague    Encounter for screening for depression    Encounter for immunization    Epigastric pain    Other chronic diseases of tonsils and adenoids        Immunizations:     influenza, injectable, quadrivalent, preservative free (FLUZONE QUAD [id number]) 1/17/2020    Fluzone (3 + years dose) 9/20/2018        Allergies:     Last Reviewed on 9/15/2020 11:27 AM by Jeanine Purvis    No Known Allergies.        Current Medications:     Last Reviewed on 9/15/2020 12:45 PM by Elayne Sprague    Low-Ogestrel (28) 0.3-30 mg-mcg oral tablet [Take 1 tablet(s) by mouth daily as directed.]        Objective:        Vitals:         Current: 9/15/2020 11:25:36 AM    Ht:  5 ft, 11 in;  Wt: 168.4 lbs;  BMI: 23.5T: 97.8 F (oral);  BP: 128/81 mm Hg (left arm, sitting);  P: 92 bpm (right arm (BP Cuff), sitting);  sCr: 0.91 mg/dL;  GFR: 109.82        Exams:     PHYSICAL  EXAM:     GENERAL: vital signs recorded - well developed, well nourished;  no apparent distress;     RESPIRATORY: normal appearance and symmetric expansion of chest wall; normal respiratory rate and pattern with no distress; normal breath sounds with no rales, rhonchi, wheezes or rubs;     CARDIOVASCULAR: normal rate; rhythm is regular;  no edema;     GASTROINTESTINAL: mild LUQ tenderness; moderate epigastric pain;  normal bowel sounds; no organomegaly;     MUSCULOSKELETAL: normal gait; normal range of motion of all major muscle groups; no limb or joint pain with range of motion;     NEUROLOGIC: mental status: alert and oriented x 3;     PSYCHIATRIC: appropriate affect and demeanor; normal speech pattern; normal thought and perception;         Assessment:         Z13.31   Encounter for screening for depression       R10.13   Epigastric pain           ORDERS:         Meds Prescribed:       [New Rx] sucralfate 1 gram oral tablet [take 1 tablet (1 gram) by oral route 2 times per day on an empty stomach], #28 (twenty eight) tablets, Refills: 0 (zero)         Lab Orders:       [id number]  HPT - [facility] H.pylori Breath test  (Send-Out)            FUTURE  Future order to be done at patients convenience  (Send-Out)            [id number]*  Hemocult cards sent home with patient  (Send-Out)              Other Orders:         Depression screen negative  (In-House)                      Plan:         Encounter for screening for depression    MIPS Negative Depression Screen           Orders:         Depression screen negative  (In-House)              Epigastric painWill check for Hpylori, instructed to avoid all NSAIDS and ETOH, recommend that she stop taking pepto  ,Will send in carafate for now and sent stool cards home for testing TO BE DONE IF HPYLORI NEG -Referral if no indication as to cause on preliminary testing (RUQ US completed in past with similar symptoms  - defer to repeat today)    LABORATORY:  Labs ordered to be  performed today include H. pylori breath test.      FOLLOW-UP TESTING #1: FOLLOW-UP LABORATORY:  Labs to be scheduled in the future include Hemocult take home cards.            Prescriptions:       [New Rx] sucralfate 1 gram oral tablet [take 1 tablet (1 gram) by oral route 2 times per day on an empty stomach], #28 (twenty eight) tablets, Refills: 0 (zero)           Orders:       [id number]  HPT - [facility] H.pylori Breath test  (Send-Out)            FUTURE  Future order to be done at patients convenience  (Send-Out)            [id number]*  Hemocult cards sent home with patient  (Send-Out)                  Patient Recommendations:        For  Epigastric pain:            The following laboratory testing has been ordered:             Charge Capture:         Primary Diagnosis:     Z13.31  Encounter for screening for depression           Orders:      [id number]  Office/outpatient visit; established patient, level 3  (In-House)              Depression screen negative  (In-House)              R10.13  Epigastric pain

## 2021-05-18 NOTE — PROGRESS NOTES
Lin Jimenez 2000     Office/Outpatient Visit    Visit Date: Fri, Sep 20, 2019 12:54 pm    Provider: Elayne Sprague N.P. (Assistant: Marcia Mancini MA)    Location: [facility]        Electronically signed by Elayne Sprague N.P. on  09/20/2019 01:36:14 PM                             SUBJECTIVE:        CC:     Ms. Jimenez is a 19 year old female.  This is her first visit to the clinic.  She presents with epigastric pain after eating. Started a month ago.  wanting flu vaccine;         HPI: LMP about 2 weeks ago         PHQ-9 Depression Screening: Completed form scanned and in chart; Total Score 0         In regard to the abdominal pain, other specified site, this is located primarily in the epigastric region.  It does not radiate.  It began 2 or more months ago.  The onset of pain occurred about 10 min after eating.  She denies anorexia, melena,,  change in bowel habits, constipation, diarrhea, dysuria, fever, nausea, vomiting and heartburn.  does not take any OTC medications routinely         Dx with allergic rhinitis, unspecified cause; these started years ago.  The allergy pattern seems to be perennial.  Known allergy triggers include pollens.  Medications previously used include singulair.  She has never been on immunotherapy.  would like to refill medications here     ROS:     CONSTITUTIONAL:  Negative for chills, fatigue and fever.      CARDIOVASCULAR:  Negative for chest pain and pedal edema.      RESPIRATORY:  Negative for recent cough and dyspnea.      GASTROINTESTINAL:  Positive for abdominal pain ( epigastric ).   Negative for acid reflux symptoms, anorexia, abdominal bloating, constipation, diarrhea, heartburn, melena, nausea or vomiting.      MUSCULOSKELETAL:  Negative for back pain.      PSYCHIATRIC:  Negative for anxiety, depression, feelings of stress, difficulty concentrating, sadness, sleep disturbance and suicidal thoughts.          PMH/FMH/SH:     Last Reviewed on  9/20/2019 01:11 PM by Elayne Sprague    Past Medical History:             PAST MEDICAL HISTORY         Positive for    Allergies;         Surgical History:     NONE         Family History:     Father: Hypertension     Mother: Healthy     Brother(s): 1 brother(s) total     Paternal Grandmother: Hypertension;  Crohn's Disease     Maternal Grandfather: Hypertension; Myocardial Infarction ( 50-60s )     Maternal Grandmother: Breast Cancer ( Dx at age 50-60 )         Social History:     Occupation: [employer]. Student     Marital Status: Single     Children: None         Tobacco/Alcohol/Supplements:     Last Reviewed on 9/20/2019 01:11 PM by Elayne Sprague    Tobacco: She has never smoked.  Non-drinker     Caffeine:  She admits to consuming caffeine via coffee.          Substance Abuse History:     Last Reviewed on 9/20/2019 01:11 PM by Elayne Sprague    None         Mental Health History:     Last Reviewed on 9/20/2019 01:11 PM by Elayne Sprague    NEGATIVE         Communicable Diseases (eg STDs):     Last Reviewed on 9/20/2019 01:11 PM by Elayne Sprague    Reportable health conditions; NEGATIVE             Current Problems:     Last Reviewed on 9/20/2019 01:11 PM by Elayne Sprague    Allergic rhinitis, unspecified cause     Abdominal pain, other specified site     Screening for depression         Immunizations:     Fluzone (3 + years dose) 9/20/2018         Allergies:     Last Reviewed on 9/20/2019 01:11 PM by Elayne Sprague      No Known Drug Allergies.         Current Medications:     Last Reviewed on 9/20/2019 01:11 PM by Elayne Sprague    Low-Ogestrel 28 Tablet Take 1 tablet(s) by mouth daily as directed.     Fluticasone Propionate 50mcg/1actuation Nasal Spray 1 spray each nostil daily prn     Montelukast Sodium 10mg Tablet 1 tab daily     Spironolactone 50mg Tablet 2 tablets PRN         OBJECTIVE:        Vitals:         Current: 9/20/2019 1:02:51 PM    Ht:  5 ft, 11 in (99.59%);  Wt: 166.2 lbs  (90.73%);  BMI: 23.2    T: 98.7 F (oral);  BP: 120/77 mm Hg (left arm, sitting);  P: 81 bpm (left arm (BP Cuff), sitting)        Exams:     PHYSICAL EXAM:     GENERAL: vital signs recorded - well developed, well nourished;  no apparent distress;     NECK: range of motion is normal;     RESPIRATORY: normal appearance and symmetric expansion of chest wall; normal respiratory rate and pattern with no distress; normal breath sounds with no rales, rhonchi, wheezes or rubs;     CARDIOVASCULAR: normal rate; rhythm is regular;  no edema;     GASTROINTESTINAL: mild epigastric and periumbilical tenderness; moderate RLQ pain;  hyperactive bowel sounds;     LYMPHATIC: no enlargement of cervical or facial nodes; no supraclavicular nodes;     MUSCULOSKELETAL: normal gait; normal range of motion of all major muscle groups; no limb or joint pain with range of motion;     NEUROLOGIC: mental status: alert and oriented x 3;     PSYCHIATRIC: appropriate affect and demeanor; normal speech pattern; normal thought and perception;         ASSESSMENT           V79.0   Z13.31  Screening for depression              DDx:     789.09   R10.813   R10.13  Abdominal pain, other specified site              DDx:     477.9   J30.9  Allergic rhinitis, unspecified cause              DDx:         ORDERS:         Meds Prescribed:       Refill of: Fluticasone Propionate 50mcg/1actuation Nasal Spray 1 spray each nostil daily prn  #16 (Sixteen) gm Refills: 2       Refill of: Montelukast Sodium 10mg Tablet 1 tab daily  #90 (Ninety) tablet(s) Refills: 3       Omeprazole 20mg Capsules, Extended Release 1 tab daily x1week  #7 (Seven) capsule(s) Refills: 0         Lab Orders:       11573  [facility] CBC w/o diff  (Send-Out)         30572  COMP - [facility] Comp. Metabolic Panel  (Send-Out)         74259  [facility] Urinalysis, automated, with micro  (Send-Out)           Other Orders:         Depression screen negative  (In-House)                   PLAN: no flu  vaccines available today - instructed to return next month or she can get at pharmacy          Screening for depression     MIPS PHQ-9 Depression Screening: Completed form scanned and in chart; Total Score 0; Negative Depression Screen           Orders:         Depression screen negative  (In-House)            Abdominal pain, other specified site     LABORATORY:  Labs ordered to be performed today include CBC W/O DIFF, Comprehensive metabolic panel, and urinalysis with micro.            Prescriptions:       Omeprazole 20mg Capsules, Extended Release 1 tab daily x1week  #7 (Seven) capsule(s) Refills: 0           Orders:       78619  BDCB2 - [facility] CBC w/o diff  (Send-Out)         22749  COMP - [facility] Comp. Metabolic Panel  (Send-Out)         73903  [facility]UAM - [facility] Urinalysis, automated, with micro  (Send-Out)            Allergic rhinitis, unspecified cause           Prescriptions:       Refill of: Fluticasone Propionate 50mcg/1actuation Nasal Spray 1 spray each nostil daily prn  #16 (Sixteen) gm Refills: 2       Refill of: Montelukast Sodium 10mg Tablet 1 tab daily  #90 (Ninety) tablet(s) Refills: 3             CHARGE CAPTURE           **Please note: ICD descriptions below are intended for billing purposes only and may not represent clinical diagnoses**        Primary Diagnosis:         V79.0 Screening for depression            Z13.31    Encounter for screening for depression              Orders:          27744   Office visit - new pt, level 2  (In-House)                Depression screen negative  (In-House)           789.09 Abdominal pain, other specified site            R10.813    Right lower quadrant abdominal tenderness           R10.13    Epigastric pain    477.9 Allergic rhinitis, unspecified cause            J30.9    Allergic rhinitis, unspecified        ADDENDUMS:      ____________________________________    Addendum: 09/24/2019 10:24 AM - Elayne Sprague office visit code to [id number]

## 2021-05-18 NOTE — PROGRESS NOTES
Lin Jimenez  2000     Office/Outpatient Visit    Visit Date: Wed, Nov 27, 2019 01:44 pm    Provider: Kenisha Moreno N.P. (Assistant: Spurling, Sarah C, MA)    Location: [facility]        Electronically signed by Kenisha Moreno N.P. on  11/30/2019 06:43:03 PM                             Subjective:        CC: Ms. Jimenez is a 19 year old female.  5 days ago started vomiting and sick to her stomach, she said it all started back up again last night. Diarrhea as well, that started yesterday.;         HPI: lmp 3 wks ago          Ms. Jimenez presents with vomiting, unspecified.      nausea and vomiting Last episode at last night There is no clear relationship between the symptoms and meals.  Associated symptoms include diarrhea.  She denies associated abdominal pain or fever.  Pertinent medical history is unremarkable.  vomited 3-4 times five days ago.  improved then restarted yesterday.  vomited 3-4 times in the last 24 hrs.  diarrhea x 24 hrs.  no ill contacts.  no recent antibitoic use.            Dx with diarrhea, unspecified; there has been no recent ingestion of antibiotics, shellfish, or undercooked hamburger.  She denies diarrhea in any family member or other close contact.  see HPI above.      ROS:     CONSTITUTIONAL:  Positive for fatigue.   Negative for fever.      CARDIOVASCULAR:  Negative for chest pain, palpitations, tachycardia, orthopnea, and edema.      RESPIRATORY:  Negative for cough, dyspnea, and hemoptysis.      GASTROINTESTINAL:  Positive for diarrhea, nausea and vomiting.   Negative for abdominal pain.      GENITOURINARY:  Negative for genital lesions, hematuria, menstrual problems, polyuria, abnormal vaginal bleeding, and vaginal discharge.      MUSCULOSKELETAL:  Negative for arthralgias, back pain, and myalgias.      NEUROLOGICAL:  Negative for dizziness, headaches, paresthesias, and weakness.          Past Medical History / Family History / Social History:          Last Reviewed on 9/20/2019 01:11 PM by Elayne Sprague    Past Medical History:             PAST MEDICAL HISTORY         Positive for    Allergies;         Surgical History:     NONE         Family History:     Father: Hypertension     Mother: Healthy     Brother(s): 1 brother(s) total     Paternal Grandmother: Hypertension;  Crohn's Disease     Maternal Grandfather: Hypertension; Myocardial Infarction ( 50-60s )     Maternal Grandmother: Breast Cancer ( Dx at age 50-60 )         Social History:     Occupation: [employer]. Student     Marital Status: Single     Children: None         Tobacco/Alcohol/Supplements:     Last Reviewed on 11/27/2019 01:46 PM by Spurling, Sarah C    Tobacco: She has never smoked.  Non-drinker     Caffeine:  She admits to consuming caffeine via coffee.          Substance Abuse History:     Last Reviewed on 9/20/2019 01:11 PM by Elayne Sprague    None         Mental Health History:     Last Reviewed on 9/20/2019 01:11 PM by Elayne Sprague    NEGATIVE         Communicable Diseases (eg STDs):     Last Reviewed on 9/20/2019 01:11 PM by Elayne Sprague    Reportable health conditions; NEGATIVE         Current Problems:     Last Reviewed on 9/20/2019 01:11 PM by Elayne Sprague    Vomiting, unspecified    Diarrhea, unspecified        Immunizations:     Fluzone (3 + years dose) 9/20/2018        Allergies:     Last Reviewed on 9/20/2019 01:11 PM by Elayne Sprague    No Known Allergies.        Current Medications:     Last Reviewed on 11/27/2019 01:46 PM by Spurling, Sarah C    Spironolactone 50mg Tablet [2 tablets PRN]    Low-Ogestrel 28 30mcg/0.3mg Tablet [Take 1 tablet(s) by mouth daily as directed.]    Fluticasone Propionate 50mcg/1actuation Nasal Spray [1 spray each nostil daily prn]    Montelukast Sodium 10mg Tablet [1 tab daily]        Objective:        Vitals:         Historical:     9/20/2019  BP:   120/77 mm Hg ( (left arm, , sitting, );) 9/20/2019  Wt:   166.2lbs ( (90.73%))      Current: 11/27/2019 1:48:52 PM    Ht:  5 ft, 11 in (99.59%);  Wt: 165.8 lbs (90.36%);  BMI: 23.1T: 99 F (oral);  BP: 124/67 mm Hg (right arm, sitting);  P: 109 bpm (right arm (BP Cuff), sitting);  sCr: 0.91 mg/dL;  GFR: 109.00        Exams:     PHYSICAL EXAM:     GENERAL:  well developed and nourished; appropriately groomed; in no apparent distress;     RESPIRATORY: normal respiratory rate and pattern with no distress; normal breath sounds with no rales, rhonchi, wheezes or rubs;     CARDIOVASCULAR: normal rate; rhythm is regular;     GASTROINTESTINAL: nontender; normal bowel sounds; no organomegaly;     MUSCULOSKELETAL:  Normal range of motion, strength and tone;     NEUROLOGIC: mental status: alert and oriented x 3; GROSSLY INTACT     PSYCHIATRIC:  appropriate affect and demeanor; normal speech pattern; grossly normal memory;         Assessment:         R11.10   Vomiting, unspecified       R19.7   Diarrhea, unspecified           ORDERS:         Meds Prescribed:       [New Rx] ondansetron 4 mg oral Tablet,disintegrating [place  1 tablet  on top of the tongue and allow to dissolve q 6 hrs prn nausea or vomiting], #14 (fourteen) tablets, Refills: 0 (zero)         Lab Orders:       [id number]  [facility] - [facility] Amylase, Serum  (Send-Out)            49216  BDCB2 - [facility] CBC w/o diff  (Send-Out)            91315  COMP - [facility] Comp. Metabolic Panel  (Send-Out)            13159  LIP - [facility] Lipase, Serum  (Send-Out)                      Plan:         Vomiting, unspecified    LABORATORY:  Labs ordered to be performed today include amylase, CBC W/O DIFF, Comprehensive metabolic panel, and Lipase.      RECOMMENDATIONS given include: get plenty of rest, maintain a clear liquid diet, resume intake of solid foods gradually, and Go to the ER if worse.            Prescriptions:       [New Rx] ondansetron 4 mg oral Tablet,disintegrating [place  1 tablet  on top of the tongue and allow to dissolve q 6 hrs prn nausea or vomiting], #14 (fourteen)  tablets, Refills: 0 (zero)           Orders:       32129  [facility] - [facility] Amylase, Serum  (Send-Out)            00496  BDCB2 - [facility] CBC w/o diff  (Send-Out)            12766  COMP - [facility] Comp. Metabolic Panel  (Send-Out)            36436  LIP - [facility] Lipase, Serum  (Send-Out)                Patient Education Handouts:       Gastroenteritis          Diarrhea, unspecified        RECOMMENDATIONS given include: maintain a clear liquid diet, begin oral fluid replacement with a glucose and electrolyte containing solution, and Rest.              Patient Recommendations:        For  Vomiting, unspecified:    Get plenty of rest.     Maintain a diet consisting of clear liquids (clear beverages, broth, gelatin, flavored ices, pediatric electrolyte solutions, sports drinks, etc.).  Avoid solid foods or formula. After 4-8 hours have passed without vomiting, you may gradually advance your diet to include bland foods, such as saltine crackers or bread, or dilute formula.          For  Diarrhea, unspecified:    You should replace fluid losses by drinking frequent, small amounts of a glucose and electrolyte containing solution. There are several commercially available products.              Charge Capture:         Primary Diagnosis:     R11.10  Vomiting, unspecified           Orders:      65107  Office/outpatient visit; established patient, level 3  (In-House)              R19.7  Diarrhea, unspecified

## 2021-05-18 NOTE — PROGRESS NOTES
Lin Jimenez  2000     Office/Outpatient Visit    Visit Date: Fri, Jan 17, 2020 09:33 am    Provider: Majo Sosa N.P. (Assistant: Sherrie Newsome MA)    Location: [facility]        Electronically signed by Majo Sosa N.P. on  01/17/2020 10:37:29 AM                             Subjective:        CC: Ms. Jimenez is a 19 year old female.  Patient presents today with complaints of something stuck in right side tonsil;         HPI: Lin presents with c/o feeling like she has a piece of popcorn stuck in her right tonsil. Symptoms started 2 days ago. She notes that this tonsil has been bigger than her left tonsil for years. She has history of recurrent tonsil stones. Would like to see ENT as this has been a recurrent problem.    ROS:     CONSTITUTIONAL:  Negative for chills and fever.      EYES:  Negative for eye drainage and eye pain.      E/N/T:  Positive for feeling like something stuck in her tonsil.   Negative for ear pain.      CARDIOVASCULAR:  Negative for chest pain and palpitations.      RESPIRATORY:  Negative for dyspnea and frequent wheezing.      PSYCHIATRIC:  Negative for depression and suicidal thoughts.          Past Medical History / Family History / Social History:         Last Reviewed on 9/20/2019 01:11 PM by Elayne Sprague    Past Medical History:             PAST MEDICAL HISTORY         Positive for    Allergies;         Surgical History:     NONE         Family History:     Father: Hypertension     Mother: Healthy     Brother(s): 1 brother(s) total     Paternal Grandmother: Hypertension;  Crohn's Disease     Maternal Grandfather: Hypertension; Myocardial Infarction ( 50-60s )     Maternal Grandmother: Breast Cancer ( Dx at age 50-60 )         Social History:     Occupation: [employer]. Student     Marital Status: Single     Children: None         Tobacco/Alcohol/Supplements:     Last Reviewed on 11/27/2019 01:46 PM by Spurling, Sarah C    Tobacco: She has never smoked.   Non-drinker     Caffeine:  She admits to consuming caffeine via coffee.          Substance Abuse History:     Last Reviewed on 9/20/2019 01:11 PM by Elayne Sprague    None         Mental Health History:     Last Reviewed on 9/20/2019 01:11 PM by Elayne Sprague    NEGATIVE         Communicable Diseases (eg STDs):     Last Reviewed on 9/20/2019 01:11 PM by Elayne Sprague    Reportable health conditions; NEGATIVE         Current Problems:     Last Reviewed on 9/20/2019 01:11 PM by Elayne Sprague    Vomiting, unspecified    Diarrhea, unspecified        Immunizations:     Fluzone (3 + years dose) 9/20/2018        Allergies:     Last Reviewed on 11/27/2019 01:46 PM by Spurling, Sarah C    No Known Allergies.        Current Medications:     Last Reviewed on 11/27/2019 01:46 PM by Spurling, Sarah C    Spironolactone 50 mg oral tablet [2 tablets PRN]    Low-Ogestrel (28) 0.3-30 mg-mcg oral tablet [Take 1 tablet(s) by mouth daily as directed.]    montelukast 10 mg oral tablet [1 tab daily]    Fluticasone Propionate 50 mcg/actuation Intranasal Spray, Suspension [1 spray each nostil daily prn]    ondansetron 4 mg oral Tablet,disintegrating [place  1 tablet  on top of the tongue and allow to dissolve q 6 hrs prn nausea or vomiting]        Objective:        Vitals:         Historical:     11/27/2019  BP:   124/67 mm Hg ( (right arm, , sitting, );) 11/27/2019  P:   109bpm ( (right arm (BP Cuff), , sitting, );) 11/27/2019  Wt:   165.8lbs ( (90.36%))     Current: 1/17/2020 9:37:52 AM    Ht:  5 ft, 11 in (99.59%);  Wt: 167.2 lbs (90.77%);  BMI: 23.3T: 98.5 F (oral);  BP: 124/89 mm Hg (right arm, sitting);  P: 123 bpm (right arm (BP Cuff), sitting);  sCr: 0.91 mg/dL;  GFR: 109.00O2 Sat: 98 % (room air)        Repeat:     9:52:29 AM  BP:   124/74mm Hg9:52:49 AM  P:   88bpm    Exams:     PHYSICAL EXAM:     GENERAL: well developed, well nourished;  no apparent distress;     EYES: PERRL, EOMI     E/N/T: EARS: external auditory  canal normal;  bilateral TMs are normal;  NOSE: normal turbinates; no sinus tenderness; OROPHARYNX: oral mucosa is normal; posterior pharynx shows no exudate and right tonsil mildly enlarged;     NECK: range of motion is normal; trachea is midline;     RESPIRATORY: normal respiratory rate and pattern with no distress; normal breath sounds with no rales, rhonchi, wheezes or rubs;     CARDIOVASCULAR: normal rate; rhythm is regular;     MUSCULOSKELETAL: normal gait; no limb or joint pain with range of motion;     NEUROLOGIC: mental status: alert and oriented x 3; GROSSLY INTACT     PSYCHIATRIC: appropriate affect and demeanor;         Lab/Test Results:         Rapid Strep Screen: Negative (01/17/2020),     Performed by:: chinedu (01/17/2020),             Assessment:         J02   Acute pharyngitis       J35.8   Other chronic diseases of tonsils and adenoids       Z23   Encounter for immunization       Z13.31   Encounter for screening for depression           ORDERS:         Lab Orders:       [id number]  [facility] Rapid strep A  (In-House)            69114  [facility] Throat culture, strep  (Send-Out)              Procedures Ordered:       07501  Immunization administration; one vaccine  (In-House)            REFER  Referral to Specialist or Other Facility  (Send-Out)              Other Orders:       37999  Influenza virus vaccine, quadrivalent, split virus, preservative free 3 years of age & older  (In-House)              Depression screen negative  (In-House)                      Plan:         Acute pharyngitisRSS negative. No foreign body or tonsil stone visualized on exam today. Right tonsil noted to be larger than left. Advised warm salt water gargles, sucking on mints/hard candies. With history of recurrent symptoms, will refer to ENT for further evaluation. Advised to follow up for recurrent symptoms.     LABORATORY:  Labs ordered to be performed today include rapid strep test.  MIPS Vaccines Flu and Pneumonia  updated in Shot record     FOLLOW-UP: for Annual Checkup           Orders:       55160  [facility] Rapid strep A  (In-House)            20814  [facility] Throat culture, strep  (Send-Out)              Other chronic diseases of tonsils and adenoids        REFERRALS:  Referral initiated to an E/N/T.            Orders:       REFER  Referral to Specialist or Other Facility  (Send-Out)              Encounter for immunization          Immunizations:       98859  Immunization administration; one vaccine  (In-House)            28748  Influenza virus vaccine, quadrivalent, split virus, preservative free 3 years of age & older  (In-House)                Dose (ml): 0.5  Site: right deltoid  Route: intramuscular  Administered by: Sherrie Newsome          : [employer]  Lot #: [id number]  Exp: 06/30/2020          NDC: [address]-89        Encounter for screening for depression    MIPS PHQ-9 Depression Screening: Completed form scanned and in chart; Total Score 0; Negative Depression Screen           Orders:         Depression screen negative  (In-House)                  Charge Capture:         Primary Diagnosis:     J02  Acute pharyngitis           Orders:      41623  Office/outpatient visit; established patient, level 3  (In-House)            62629  [facility] Rapid strep A  (In-House)              J35.8  Other chronic diseases of tonsils and adenoids     Z23  Encounter for immunization           Orders:      57257  Immunization administration; one vaccine  (In-House)            62853  Influenza virus vaccine, quadrivalent, split virus, preservative free 3 years of age & older  (In-House)              Z13.31  Encounter for screening for depression           Orders:        Depression screen negative  (In-House)                  ADDENDUMS:      ____________________________________    Date: 01/17/2020 10:42 AM    Author: Deborah Whitlock         Visit Note Faxed to:        Mario Solis  (Otolaryngology);  Number [phone]

## 2021-06-09 NOTE — PROGRESS NOTES
"Chief Complaint   Patient presents with   • Follow-up     Liver lesion, bloating, early satiety         History of Present Illness  21-year-old female presents today for follow-up.  She was last seen in office on 9/17/2020.  She is a history of GERD, nausea and vomiting, and abdominal pain.      She underwent EGD on 10/14/2020 which revealed significant angulation leading from the duodenal bulb into the more distal duodenum.  She was recommended to undergo small bowel follow-through for further evaluation, but this has not yet been performed.  Liver ultrasound on 4/15/2021 demonstrated a 1.4 cm lesion in the left lobe of the liver which could have been representative of hemangioma or adenoma.  She was recommended to undergo follow-up CT scan, but this has not yet been performed.  Hepatic function panel was normal on 9/16/2020.  Trace amount of gallbladder sludge was noted on ultrasound of the right upper quadrant on 9/16/2020 along with findings of a 12 x 7 mm hypoechoic nodule in the liver as noted on liver ultrasound which was done on 4/15/2021.    She denies having any heartburn or reflux, or epigastric pain.  She does notice some nausea if she overeats.  She denies emesis.  She reports early satiety on a frequent basis.  She is not diabetic.  She denies dysphagia.    She reports significant abdominal bloating and at times feels that her stomach becomes hard and she looks \" 6 months pregnant\".  Sometimes do seem to be worse with bread.  She has tried to keep a food diary but cannot identify really any specific foods that contribute to symptoms.  She does note that fast food can make things worse.  She has previously been tested for celiac disease which was negative.    She reports having regular daily bowel movements and denies any diarrhea, constipation, melena, or hematochezia.  Her weight is stable.    Review of Systems   Constitutional: Negative for fever and unexpected weight change.   HENT: Negative for " "trouble swallowing.    Cardiovascular: Negative for chest pain.   Gastrointestinal: Positive for abdominal distention and nausea. Negative for abdominal pain, anal bleeding, blood in stool, constipation, diarrhea, rectal pain and vomiting.         Result Review :       Office Visit with Cornelio Ford MD (09/17/2020)  US Liver (04/26/2021 12:53)  US Gallbladder (09/16/2020 07:07)  ENDOSCOPY, INT (10/14/2020)  SCANNED PATHOLOGY (10/09/2020)  Comprehensive Metabolic Panel (09/16/2020 07:09)      Vital Signs:   /70   Pulse 75   Temp 98 °F (36.7 °C)   Resp 16   Ht 180.3 cm (71\")   Wt 77.5 kg (170 lb 12.8 oz)   SpO2 98%   BMI 23.82 kg/m²     Body mass index is 23.82 kg/m².     Physical Exam  Vitals reviewed.   Constitutional:       Appearance: Normal appearance.   Pulmonary:      Effort: Pulmonary effort is normal. No respiratory distress.   Abdominal:      General: Abdomen is flat. Bowel sounds are normal. There is no distension.      Palpations: Abdomen is soft. There is no mass.      Tenderness: There is no abdominal tenderness. There is no guarding.   Musculoskeletal:         General: Normal range of motion.   Skin:     General: Skin is warm and dry.   Neurological:      General: No focal deficit present.      Mental Status: She is alert and oriented to person, place, and time.   Psychiatric:         Mood and Affect: Mood normal.         Behavior: Behavior normal.         Thought Content: Thought content normal.         Judgment: Judgment normal.       Assessment and Plan    Diagnoses and all orders for this visit:    1. Liver lesion (Primary)  -     CT Abdomen Liver With & Without Contrast; Future    2. Gastroesophageal reflux disease, unspecified whether esophagitis present    3. Bloating  Comments:  New and undiagnosed problem    4. Early satiety  Comments:  New and undiagnosed problem             Follow Up   Return in about 8 weeks (around 8/5/2021).    Patient Instructions   1.  For further " evaluation ablation of liver lesion noted on ultrasound we will order a CT scan of the abdomen and pelvis with and without contrast per radiology recommendations.  You will be contacted to schedule this testing and we will contact you with results once available.    2.  For abdominal bloating we recommend following the low FODMAP diet.  Copy of this diet has been provided.  This diet will help you to identify specific foods that are contributing to symptoms.    3.  For early satiety we recommend eating smaller more frequent meals and avoiding foods that are high in fat and fiber.  Vegetables are fine, but we recommend that you eat cooked vegetables and avoid raw vegetables as these are more difficult for the stomach to breakdown.  We also recommend avoidance of thick meats such as steaks, pork chops, and cubed chicken.    4.  Additionally for early satiety and nausea, we recommend use of gingerroot. Lalitha root is available over-the-counter in either a 500 mg or 550 mg formulation, either is fine.  Lalitha helps to naturally settle and empty the stomach.  You may take 1-2 capsules before meals up to 3 times daily; maximum dose is 6 capsules/day.  This can be purchased over-the-counter at your local grocery or pharmacy.    5.  We will plan for telephone follow-up visit in 8 weeks for reassessment of symptoms, or sooner should symptoms worsen or fail to improve.       Discussion:    We will proceed with CT scan of the abdomen and pelvis with and without contrast for further evaluation of liver lesion noted on ultrasound.  Most recent CMP revealed normal liver enzymes patient does not have any family history of liver disease.    Female exams are up-to-date.  For abdominal bloating she was provided a copy of the low FODMAP diet to help identify specific foods that could be triggering her symptoms.  For early satiety we have recommended that she eat smaller more frequent meals and follow more of a gastroparesis diet.   Handout was provided.  Should symptoms persist despite this lifestyle modification we will plan to perform a gastric emptying study.  She was also recommended to utilize gingerroot capsules for nausea and early satiety to see if this helps to improve symptoms.  We will plan for telephone follow-up visit in 8 weeks for reassessment of symptoms or sooner should symptoms worsen or fail to improve.  Patient verbalized understanding of above plan of care and is in agreement.  All questions answered and support provided.    EMR Dragon/Transcription Disclaimer:  This document has been Dictated utilizing Dragon dictation.

## 2021-06-10 ENCOUNTER — OFFICE VISIT (OUTPATIENT)
Dept: GASTROENTEROLOGY | Facility: CLINIC | Age: 21
End: 2021-06-10

## 2021-06-10 VITALS
DIASTOLIC BLOOD PRESSURE: 70 MMHG | HEART RATE: 75 BPM | TEMPERATURE: 98 F | OXYGEN SATURATION: 98 % | BODY MASS INDEX: 23.91 KG/M2 | RESPIRATION RATE: 16 BRPM | HEIGHT: 71 IN | WEIGHT: 170.8 LBS | SYSTOLIC BLOOD PRESSURE: 110 MMHG

## 2021-06-10 DIAGNOSIS — R14.0 BLOATING: ICD-10-CM

## 2021-06-10 DIAGNOSIS — K21.9 GASTROESOPHAGEAL REFLUX DISEASE, UNSPECIFIED WHETHER ESOPHAGITIS PRESENT: ICD-10-CM

## 2021-06-10 DIAGNOSIS — K76.9 LIVER LESION: Primary | ICD-10-CM

## 2021-06-10 DIAGNOSIS — R68.81 EARLY SATIETY: ICD-10-CM

## 2021-06-10 PROCEDURE — 99214 OFFICE O/P EST MOD 30 MIN: CPT | Performed by: NURSE PRACTITIONER

## 2021-06-10 NOTE — PATIENT INSTRUCTIONS
1.  For further evaluation ablation of liver lesion noted on ultrasound we will order a CT scan of the abdomen and pelvis with and without contrast per radiology recommendations.  You will be contacted to schedule this testing and we will contact you with results once available.    2.  For abdominal bloating we recommend following the low FODMAP diet.  Copy of this diet has been provided.  This diet will help you to identify specific foods that are contributing to symptoms.    3.  For early satiety we recommend eating smaller more frequent meals and avoiding foods that are high in fat and fiber.  Vegetables are fine, but we recommend that you eat cooked vegetables and avoid raw vegetables as these are more difficult for the stomach to breakdown.  We also recommend avoidance of thick meats such as steaks, pork chops, and cubed chicken.    4.  Additionally for early satiety and nausea, we recommend use of gingerroot. Lalitha root is available over-the-counter in either a 500 mg or 550 mg formulation, either is fine.  Lalitha helps to naturally settle and empty the stomach.  You may take 1-2 capsules before meals up to 3 times daily; maximum dose is 6 capsules/day.  This can be purchased over-the-counter at your local grocery or pharmacy.    5.  We will plan for telephone follow-up visit in 8 weeks for reassessment of symptoms, or sooner should symptoms worsen or fail to improve.

## 2021-07-01 VITALS
WEIGHT: 166.2 LBS | SYSTOLIC BLOOD PRESSURE: 120 MMHG | DIASTOLIC BLOOD PRESSURE: 77 MMHG | HEIGHT: 70 IN | TEMPERATURE: 98.7 F | HEART RATE: 81 BPM | BODY MASS INDEX: 23.79 KG/M2

## 2021-07-01 VITALS
TEMPERATURE: 99 F | WEIGHT: 165.8 LBS | SYSTOLIC BLOOD PRESSURE: 124 MMHG | BODY MASS INDEX: 23.74 KG/M2 | HEIGHT: 70 IN | DIASTOLIC BLOOD PRESSURE: 67 MMHG | HEART RATE: 109 BPM

## 2021-07-02 VITALS
SYSTOLIC BLOOD PRESSURE: 124 MMHG | DIASTOLIC BLOOD PRESSURE: 74 MMHG | WEIGHT: 167.2 LBS | HEART RATE: 88 BPM | OXYGEN SATURATION: 98 % | HEIGHT: 71 IN | BODY MASS INDEX: 23.41 KG/M2 | TEMPERATURE: 98.5 F

## 2021-07-02 VITALS
HEART RATE: 92 BPM | WEIGHT: 168.4 LBS | TEMPERATURE: 97.8 F | HEIGHT: 71 IN | DIASTOLIC BLOOD PRESSURE: 81 MMHG | BODY MASS INDEX: 23.57 KG/M2 | SYSTOLIC BLOOD PRESSURE: 128 MMHG

## 2021-11-10 ENCOUNTER — TELEPHONE (OUTPATIENT)
Dept: GASTROENTEROLOGY | Facility: CLINIC | Age: 21
End: 2021-11-10

## 2021-11-10 NOTE — TELEPHONE ENCOUNTER
Called [facility] [place] dept.  [phone]  Spoke with Kait.   Patient left a message on our voice mail that there was an issue with the referral but the appt was scheduled.  Informed [facility] to call us back if there is an issue with the order.  danette

## 2021-11-19 ENCOUNTER — HOSPITAL ENCOUNTER (OUTPATIENT)
Dept: CT IMAGING | Facility: HOSPITAL | Age: 21
Discharge: HOME | End: 2021-11-19
Admitting: NURSE PRACTITIONER

## 2021-11-19 DIAGNOSIS — K76.9 LIVER LESION: ICD-10-CM

## 2021-11-19 PROCEDURE — 74170 CT ABD WO CNTRST FLWD CNTRST: CPT

## 2021-11-19 PROCEDURE — 25010000002 IOPAMIDOL 61 % SOLUTION: Performed by: NURSE PRACTITIONER

## 2021-11-19 RX ADMIN — IOPAMIDOL 85 ML: 612 INJECTION, SOLUTION INTRAVENOUS at 10:41

## 2021-11-30 DIAGNOSIS — K76.9 LIVER LESION: Primary | ICD-10-CM

## 2021-12-02 DIAGNOSIS — K76.9 LIVER LESION: Primary | ICD-10-CM

## 2021-12-23 ENCOUNTER — HOSPITAL ENCOUNTER (OUTPATIENT)
Dept: MRI IMAGING | Facility: HOSPITAL | Age: 21
End: 2021-12-23

## 2022-06-06 ENCOUNTER — OFFICE VISIT (OUTPATIENT)
Dept: FAMILY MEDICINE CLINIC | Age: 22
End: 2022-06-06

## 2022-06-06 VITALS
WEIGHT: 172 LBS | OXYGEN SATURATION: 98 % | BODY MASS INDEX: 24.08 KG/M2 | TEMPERATURE: 98.2 F | HEART RATE: 98 BPM | DIASTOLIC BLOOD PRESSURE: 74 MMHG | HEIGHT: 71 IN | SYSTOLIC BLOOD PRESSURE: 116 MMHG

## 2022-06-06 DIAGNOSIS — J02.9 PHARYNGITIS, UNSPECIFIED ETIOLOGY: Primary | ICD-10-CM

## 2022-06-06 DIAGNOSIS — J02.9 SORE THROAT: ICD-10-CM

## 2022-06-06 PROBLEM — J30.2 SEASONAL ALLERGIC RHINITIS: Status: ACTIVE | Noted: 2022-06-06

## 2022-06-06 PROBLEM — J35.8 TONSIL STONE: Status: ACTIVE | Noted: 2022-06-06

## 2022-06-06 LAB
EXPIRATION DATE: NORMAL
INTERNAL CONTROL: NORMAL
Lab: NORMAL
S PYO AG THROAT QL: NEGATIVE

## 2022-06-06 PROCEDURE — 87880 STREP A ASSAY W/OPTIC: CPT | Performed by: NURSE PRACTITIONER

## 2022-06-06 PROCEDURE — 87081 CULTURE SCREEN ONLY: CPT | Performed by: NURSE PRACTITIONER

## 2022-06-06 PROCEDURE — 99212 OFFICE O/P EST SF 10 MIN: CPT | Performed by: NURSE PRACTITIONER

## 2022-06-06 RX ORDER — CETIRIZINE HYDROCHLORIDE 10 MG/1
10 TABLET ORAL DAILY
COMMUNITY
End: 2022-11-22

## 2022-06-06 RX ORDER — AMOXICILLIN AND CLAVULANATE POTASSIUM 875; 125 MG/1; MG/1
1 TABLET, FILM COATED ORAL 2 TIMES DAILY
Qty: 14 TABLET | Refills: 0 | Status: SHIPPED | OUTPATIENT
Start: 2022-06-06 | End: 2022-06-13

## 2022-06-06 NOTE — PROGRESS NOTES
"Chief Complaint  Nasal Congestion (Patient states nasal congestion as been worse then normal for 1 week ) and Sore Throat (Patient states throat has been sore for 3 days worsening )    Subjective        Lin Jimenez presents to [facility]  Sore Throat   This is a new problem. The current episode started in the past 7 days. The problem has been gradually worsening. There has been no fever. Pertinent negatives include no coughing, ear pain, headaches or shortness of breath. She has had no exposure to strep. She has tried acetaminophen for the symptoms. The treatment provided mild relief.       Objective   Vital Signs:  /74 (BP Location: Left arm, Patient Position: Sitting, Cuff Size: Adult)   Pulse 98   Temp 98.2 °F (36.8 °C) (Oral)   Ht 180.3 cm (71\")   Wt 78 kg (172 lb)   SpO2 98%   BMI 23.99 kg/m²     BMI is within normal parameters. No other follow-up for BMI required.      Physical Exam  HENT:      Head: Normocephalic.      Right Ear: No middle ear effusion. Tympanic membrane is not erythematous.      Left Ear: A middle ear effusion is present. Tympanic membrane is not erythematous.      Nose: Rhinorrhea present.      Mouth/Throat:      Pharynx: Posterior oropharyngeal erythema present. No oropharyngeal exudate.   Cardiovascular:      Rate and Rhythm: Normal rate and regular rhythm.   Pulmonary:      Effort: Pulmonary effort is normal. No respiratory distress.      Breath sounds: Normal breath sounds. No stridor. No wheezing, rhonchi or rales.   Skin:     General: Skin is warm and dry.   Neurological:      Mental Status: She is alert and oriented to person, place, and time.   Psychiatric:         Mood and Affect: Mood normal.        Result Review :                Assessment and Plan   Diagnoses and all orders for this visit:    1. Pharyngitis, unspecified etiology (Primary)  Comments:  Rapid strep negative  Orders:  -     amoxicillin-clavulanate (Augmentin) " 875-125 MG per tablet; Take 1 tablet by mouth 2 (Two) Times a Day for 7 days.  Dispense: 14 tablet; Refill: 0    2. Sore throat  -     POCT rapid strep A  -     Beta Strep Culture, Throat - Swab, Throat; Future  -     Beta Strep Culture, Throat - Swab, Throat             Follow Up   Return if symptoms worsen or fail to improve.  Patient was given instructions and counseling regarding her condition or for health maintenance advice. Please see specific information pulled into the AVS if appropriate.

## 2022-06-08 LAB — BACTERIA SPEC AEROBE CULT: NORMAL

## 2022-11-22 ENCOUNTER — OFFICE VISIT (OUTPATIENT)
Dept: FAMILY MEDICINE CLINIC | Age: 22
End: 2022-11-22

## 2022-11-22 VITALS
OXYGEN SATURATION: 98 % | HEIGHT: 71 IN | SYSTOLIC BLOOD PRESSURE: 98 MMHG | WEIGHT: 180.6 LBS | TEMPERATURE: 99.7 F | BODY MASS INDEX: 25.28 KG/M2 | DIASTOLIC BLOOD PRESSURE: 59 MMHG | HEART RATE: 121 BPM

## 2022-11-22 DIAGNOSIS — R05.1 ACUTE COUGH: ICD-10-CM

## 2022-11-22 DIAGNOSIS — R00.0 TACHYCARDIA: ICD-10-CM

## 2022-11-22 DIAGNOSIS — J10.1 INFLUENZA A: Primary | ICD-10-CM

## 2022-11-22 LAB
EXPIRATION DATE: ABNORMAL
FLUAV AG UPPER RESP QL IA.RAPID: DETECTED
FLUBV AG UPPER RESP QL IA.RAPID: NOT DETECTED
INTERNAL CONTROL: ABNORMAL
Lab: ABNORMAL
SARS-COV-2 AG UPPER RESP QL IA.RAPID: NOT DETECTED

## 2022-11-22 PROCEDURE — 87428 SARSCOV & INF VIR A&B AG IA: CPT | Performed by: PHYSICIAN ASSISTANT

## 2022-11-22 PROCEDURE — 99213 OFFICE O/P EST LOW 20 MIN: CPT | Performed by: PHYSICIAN ASSISTANT

## 2022-11-22 RX ORDER — FEXOFENADINE HCL 180 MG/1
180 TABLET ORAL DAILY
COMMUNITY
End: 2023-01-13

## 2022-11-22 RX ORDER — BROMPHENIRAMINE MALEATE, PSEUDOEPHEDRINE HYDROCHLORIDE, AND DEXTROMETHORPHAN HYDROBROMIDE 2; 30; 10 MG/5ML; MG/5ML; MG/5ML
5 SYRUP ORAL 4 TIMES DAILY PRN
Qty: 118 ML | Refills: 0 | Status: SHIPPED | OUTPATIENT
Start: 2022-11-22 | End: 2023-01-13

## 2022-11-22 RX ORDER — SPIRONOLACTONE 100 MG/1
100 TABLET, FILM COATED ORAL DAILY
COMMUNITY

## 2022-11-22 RX ORDER — CLINDAMYCIN PHOSPHATE AND BENZOYL PEROXIDE 10; 50 MG/G; MG/G
GEL TOPICAL
COMMUNITY
Start: 2022-11-15 | End: 2022-11-22

## 2022-11-22 NOTE — PROGRESS NOTES
Subjective     CHIEF COMPLAINT    Chief Complaint   Patient presents with   • Nasal Congestion     Ongoing for 2 days.    • Chills   • Cough   • Fatigue            History of Present Illness  This is a 23-year-old female presenting the clinic complaining of nasal congestion, chills and subjective fevers, and fatigue for the last 2 to 3 days.  She states she began feeling a little tired 4 days ago but then yesterday her symptoms really worsened.  Denies any known sick contacts.  She does not have any chest pain or difficulty breathing.            Review of Systems   Constitutional: Positive for chills, fatigue and fever (subjective, last night).   HENT: Positive for congestion, postnasal drip and sinus pressure. Negative for rhinorrhea and sore throat.    Respiratory: Negative for cough, shortness of breath and wheezing.    Cardiovascular: Negative for chest pain.   Gastrointestinal: Negative for abdominal pain, diarrhea, nausea and vomiting.   Musculoskeletal: Negative for myalgias.   Skin: Negative for rash.   Neurological: Negative for headaches.            History reviewed. No pertinent past medical history.         History reviewed. No pertinent surgical history.         Family History   Problem Relation Age of Onset   • Hypertension Neg Hx    • Diabetes Neg Hx    • Crohn's disease Neg Hx             Social History     Socioeconomic History   • Marital status: Single   Tobacco Use   • Smoking status: Never   • Smokeless tobacco: Never   Substance and Sexual Activity   • Alcohol use: Not Currently   • Drug use: Never   • Sexual activity: Defer            No Known Allergies         Current Outpatient Medications on File Prior to Visit   Medication Sig Dispense Refill   • fexofenadine (ALLEGRA) 180 MG tablet Take 180 mg by mouth Daily.     • spironolactone (ALDACTONE) 100 MG tablet Take 100 mg by mouth Daily.     • [DISCONTINUED] Clindamycin Phos-Benzoyl Perox 1.2-5 % gel      • [DISCONTINUED] Low-Ogestrel 0.3-30  "MG-MCG per tablet      • [DISCONTINUED] cetirizine (zyrTEC) 10 MG tablet Take 10 mg by mouth Daily.       No current facility-administered medications on file prior to visit.            BP 98/59 (BP Location: Left arm, Patient Position: Sitting, Cuff Size: Small Adult)   Pulse (!) 121   Temp 99.7 °F (37.6 °C) (Oral)   Ht 180.3 cm (70.98\")   Wt 81.9 kg (180 lb 9.6 oz)   SpO2 98% Comment: room air  BMI 25.20 kg/m²          Objective     Physical Exam  Vitals and nursing note reviewed.   Constitutional:       General: She is not in acute distress.     Appearance: Normal appearance.   HENT:      Head: Normocephalic and atraumatic.      Right Ear: Tympanic membrane, ear canal and external ear normal.      Left Ear: Tympanic membrane, ear canal and external ear normal.      Nose: Congestion present. No rhinorrhea.      Mouth/Throat:      Mouth: Mucous membranes are moist.      Pharynx: Oropharynx is clear. Posterior oropharyngeal erythema present.   Eyes:      Extraocular Movements: Extraocular movements intact.      Conjunctiva/sclera: Conjunctivae normal.      Pupils: Pupils are equal, round, and reactive to light.   Cardiovascular:      Rate and Rhythm: Regular rhythm. Tachycardia present.      Heart sounds: Normal heart sounds.   Pulmonary:      Effort: Pulmonary effort is normal. No respiratory distress.      Breath sounds: Normal breath sounds. No wheezing.   Musculoskeletal:      Cervical back: Normal range of motion. No rigidity.   Skin:     General: Skin is warm and dry.   Neurological:      Mental Status: She is alert and oriented to person, place, and time.   Psychiatric:         Mood and Affect: Mood normal.         Behavior: Behavior normal.              Procedures                    Lab Results (last 24 hours)     Procedure Component Value Units Date/Time    POCT SARS-CoV-2 Antigen VITALIY + Flu [[id number]]  (Abnormal) Collected: 11/22/22 1040    Specimen: Swab Updated: 11/22/22 1041     SARS Antigen Not " Detected     Influenza A Antigen VITALIY Detected     Influenza B Antigen VITALIY Not Detected     Internal Control Passed     Lot Number [id number]     Expiration Date 8/25/23                No Radiology Exams Resulted Within Past 24 Hours                    Diagnoses and all orders for this visit:    1. Influenza A (Primary)  Comments:  Not a candidate for antiviral therapy. Continue with supportive care. Return/ER precautions discussed.     2. Tachycardia  Comments:  Likely related to flu/low grade fever. Push fluids. FU with PCP if symptoms persist or worsen. To ER for dizziness/SOA.     3. Acute cough  -     POCT SARS-CoV-2 Antigen VITALIY + Flu  -     brompheniramine-pseudoephedrine-DM 30-2-10 MG/5ML syrup; Take 5 mL by mouth 4 (Four) Times a Day As Needed for Allergies.  Dispense: 118 mL; Refill: 0                           FOR FULL DISCHARGE INSTRUCTIONS/COMMENTS/HANDOUTS please see the   AVS

## 2022-11-22 NOTE — PATIENT INSTRUCTIONS
Lin thank you for letting me care for you today!    Unfortunately you did test positive for the flu today.  I will send in some prescription cough medication to your pharmacy to help with your cough and congestion.  I also recommend continuing with pushing fluids and resting is much as possible.  You can take ibuprofen and Tylenol as needed to help with any fevers or body aches.  You should avoid others for 5 days from the day her symptoms began.  You should also stay away from others until you have been fever free for 24 hours without medication, and your symptoms are improving.  If your symptoms are worsening, especially any chest pain or difficulty breathing please go to the emergency department for further evaluation.

## 2023-01-13 ENCOUNTER — OFFICE VISIT (OUTPATIENT)
Dept: FAMILY MEDICINE CLINIC | Age: 23
End: 2023-01-13
Payer: COMMERCIAL

## 2023-01-13 ENCOUNTER — LAB (OUTPATIENT)
Dept: LAB | Facility: HOSPITAL | Age: 23
End: 2023-01-13
Payer: COMMERCIAL

## 2023-01-13 VITALS
HEART RATE: 83 BPM | WEIGHT: 182 LBS | HEIGHT: 71 IN | DIASTOLIC BLOOD PRESSURE: 84 MMHG | BODY MASS INDEX: 25.48 KG/M2 | SYSTOLIC BLOOD PRESSURE: 116 MMHG | OXYGEN SATURATION: 98 %

## 2023-01-13 DIAGNOSIS — Z13.6 SCREENING FOR CARDIOVASCULAR CONDITION: Primary | ICD-10-CM

## 2023-01-13 DIAGNOSIS — K76.9 LESION OF LIVER GREATER THAN 1 CM IN DIAMETER: ICD-10-CM

## 2023-01-13 DIAGNOSIS — Z13.6 SCREENING FOR CARDIOVASCULAR CONDITION: ICD-10-CM

## 2023-01-13 LAB
ALBUMIN SERPL-MCNC: 4.5 G/DL (ref 3.5–5.2)
ALP SERPL-CCNC: 81 U/L (ref 39–117)
ALT SERPL W P-5'-P-CCNC: 14 U/L (ref 1–33)
ANION GAP SERPL CALCULATED.3IONS-SCNC: 7 MMOL/L (ref 5–15)
AST SERPL-CCNC: 18 U/L (ref 1–32)
BACTERIA UR QL AUTO: ABNORMAL /HPF
BASOPHILS # BLD AUTO: 0.03 10*3/MM3 (ref 0–0.2)
BASOPHILS NFR BLD AUTO: 0.4 % (ref 0–1.5)
BILIRUB CONJ SERPL-MCNC: <0.2 MG/DL (ref 0–0.3)
BILIRUB INDIRECT SERPL-MCNC: NORMAL MG/DL
BILIRUB SERPL-MCNC: 0.3 MG/DL (ref 0–1.2)
BILIRUB UR QL STRIP: NEGATIVE
BUN SERPL-MCNC: 14 MG/DL (ref 6–20)
BUN/CREAT SERPL: 16.3 (ref 7–25)
CALCIUM SPEC-SCNC: 9.3 MG/DL (ref 8.6–10.5)
CHLORIDE SERPL-SCNC: 103 MMOL/L (ref 98–107)
CHOLEST SERPL-MCNC: 164 MG/DL (ref 0–200)
CLARITY UR: ABNORMAL
CO2 SERPL-SCNC: 27 MMOL/L (ref 22–29)
COLOR UR: YELLOW
CREAT SERPL-MCNC: 0.86 MG/DL (ref 0.57–1)
DEPRECATED RDW RBC AUTO: 40.2 FL (ref 37–54)
EGFRCR SERPLBLD CKD-EPI 2021: 98.1 ML/MIN/1.73
EOSINOPHIL # BLD AUTO: 0.3 10*3/MM3 (ref 0–0.4)
EOSINOPHIL NFR BLD AUTO: 4.1 % (ref 0.3–6.2)
ERYTHROCYTE [DISTWIDTH] IN BLOOD BY AUTOMATED COUNT: 12.4 % (ref 12.3–15.4)
GLUCOSE SERPL-MCNC: 86 MG/DL (ref 65–99)
GLUCOSE UR STRIP-MCNC: NEGATIVE MG/DL
HCT VFR BLD AUTO: 42.6 % (ref 34–46.6)
HDLC SERPL-MCNC: 53 MG/DL (ref 40–60)
HGB BLD-MCNC: 14.2 G/DL (ref 12–15.9)
HGB UR QL STRIP.AUTO: NEGATIVE
IMM GRANULOCYTES # BLD AUTO: 0 10*3/MM3 (ref 0–0.05)
IMM GRANULOCYTES NFR BLD AUTO: 0 % (ref 0–0.5)
KETONES UR QL STRIP: NEGATIVE
LDLC SERPL CALC-MCNC: 99 MG/DL (ref 0–100)
LDLC/HDLC SERPL: 1.87 {RATIO}
LEUKOCYTE ESTERASE UR QL STRIP.AUTO: NEGATIVE
LYMPHOCYTES # BLD AUTO: 2.36 10*3/MM3 (ref 0.7–3.1)
LYMPHOCYTES NFR BLD AUTO: 32 % (ref 19.6–45.3)
MCH RBC QN AUTO: 28.9 PG (ref 26.6–33)
MCHC RBC AUTO-ENTMCNC: 33.3 G/DL (ref 31.5–35.7)
MCV RBC AUTO: 86.8 FL (ref 79–97)
MONOCYTES # BLD AUTO: 0.46 10*3/MM3 (ref 0.1–0.9)
MONOCYTES NFR BLD AUTO: 6.2 % (ref 5–12)
MUCOUS THREADS URNS QL MICRO: ABNORMAL /HPF
NEUTROPHILS NFR BLD AUTO: 4.23 10*3/MM3 (ref 1.7–7)
NEUTROPHILS NFR BLD AUTO: 57.3 % (ref 42.7–76)
NITRITE UR QL STRIP: NEGATIVE
PH UR STRIP.AUTO: 5.5 [PH] (ref 5–8)
PLATELET # BLD AUTO: 251 10*3/MM3 (ref 140–450)
PMV BLD AUTO: 9.7 FL (ref 6–12)
POTASSIUM SERPL-SCNC: 4.3 MMOL/L (ref 3.5–5.2)
PROT SERPL-MCNC: 7 G/DL (ref 6–8.5)
PROT UR QL STRIP: NEGATIVE
RBC # BLD AUTO: 4.91 10*6/MM3 (ref 3.77–5.28)
RBC # UR STRIP: ABNORMAL /HPF
REF LAB TEST METHOD: ABNORMAL
SODIUM SERPL-SCNC: 137 MMOL/L (ref 136–145)
SP GR UR STRIP: >=1.03 (ref 1–1.03)
SQUAMOUS #/AREA URNS HPF: ABNORMAL /HPF
TRIGL SERPL-MCNC: 60 MG/DL (ref 0–150)
UROBILINOGEN UR QL STRIP: ABNORMAL
VLDLC SERPL-MCNC: 12 MG/DL (ref 5–40)
WBC # UR STRIP: ABNORMAL /HPF
WBC NRBC COR # BLD: 7.38 10*3/MM3 (ref 3.4–10.8)

## 2023-01-13 PROCEDURE — 99213 OFFICE O/P EST LOW 20 MIN: CPT | Performed by: NURSE PRACTITIONER

## 2023-01-13 PROCEDURE — 80061 LIPID PANEL: CPT

## 2023-01-13 PROCEDURE — 80076 HEPATIC FUNCTION PANEL: CPT

## 2023-01-13 PROCEDURE — 80048 BASIC METABOLIC PNL TOTAL CA: CPT

## 2023-01-13 PROCEDURE — 36415 COLL VENOUS BLD VENIPUNCTURE: CPT

## 2023-01-13 PROCEDURE — 85025 COMPLETE CBC W/AUTO DIFF WBC: CPT

## 2023-01-13 PROCEDURE — 81001 URINALYSIS AUTO W/SCOPE: CPT

## 2023-01-13 NOTE — PROGRESS NOTES
Chief Complaint  Lin Jimenez presents to [facility] for Other (Patient states she went to ER 2 years ago in 2020 and states they found a liver lesion is wanting referral/ labs. Also is wanting a check up )      Subjective     History of Present Illness  Lin is here with her father for follow up on previous GI complaints.  In 2020, she was worked up for abdominal pain, nausea/vomiting- She had previous US 12mm  of RUQ noting liver lesion   Last CT 11/19/2021  which was a follow up noted still with presence of liver lesion 1.3cm thought to be adenoma vs hemangioma.  Recommended for any further follow up , US would be sufficient . However, an MRI of abdomen was ordered and not completed. She did have an EGD with Dr. Cornelio Ford for her symptoms and was overall benign other than angular duodenal bulb. Her symptoms of nausea/vomiting have improved with dietary changes .   Symptoms include early satiety,  bloating ,tenderness in the epigastric area, nausea.  She does report that previously she was vomiting- but this is no longer a problem. She still is not able to eat 'normal' portion sizes and has to eat small frequent meals.    Her weight has not been affected.          Assessment and Plan       Diagnoses and all orders for this visit:    1. Screening for cardiovascular condition (Primary)  -     Basic metabolic panel; Future  -     CBC & Differential; Future  -     Lipid panel; Future    2. Lesion of liver greater than 1 cm in diameter  Comments:  we will schedule her with local GI for follow up and recommendations moving forward.  Assessment is benign today   Orders:  -     Ambulatory Referral to Gastroenterology  -     Hepatic Function Panel; Future  -     Urinalysis With Microscopic - Urine, Clean Catch; Future        No orders of the defined types were placed in this encounter.      Medications Discontinued During This Encounter   Medication Reason   •  "brompheniramine-pseudoephedrine-DM 30-2-10 MG/5ML syrup *Therapy completed   • fexofenadine (ALLEGRA) 180 MG tablet *Therapy completed       Follow Up   Return in about 3 months (around 4/13/2023) for Annual physical.         Review of Systems   Gastrointestinal: Positive for abdominal distention and nausea (related to eating). Negative for constipation and diarrhea.       Objective     Vitals:    01/13/23 0807   BP: 116/84   BP Location: Left arm   Patient Position: Sitting   Cuff Size: Adult   Pulse: 83   SpO2: 98%   Weight: 82.6 kg (182 lb)   Height: 180.3 cm (71\")     Body mass index is 25.38 kg/m².     Physical Exam  Constitutional:       General: She is not in acute distress.     Appearance: Normal appearance.   HENT:      Head: Normocephalic.   Cardiovascular:      Rate and Rhythm: Normal rate and regular rhythm.   Pulmonary:      Effort: Pulmonary effort is normal.      Breath sounds: Normal breath sounds.   Abdominal:      Tenderness: There is no abdominal tenderness.   Musculoskeletal:         General: Normal range of motion.   Neurological:      General: No focal deficit present.      Mental Status: She is alert and oriented to person, place, and time.   Psychiatric:         Mood and Affect: Mood normal.         Behavior: Behavior normal.            Result Review                       No Known Allergies   History reviewed. No pertinent past medical history.  Current Outpatient Medications   Medication Sig Dispense Refill   • spironolactone (ALDACTONE) 100 MG tablet Take 100 mg by mouth Daily.       No current facility-administered medications for this visit.     History reviewed. No pertinent surgical history.   Health Maintenance Due   Topic Date Due   • TDAP/TD VACCINES (1 - Tdap) Never done   • HEPATITIS C SCREENING  Never done   • ANNUAL PHYSICAL  Never done   • PAP SMEAR  Never done   • COVID-19 Vaccine (3 - Booster for Pfizer series) 11/16/2021   • INFLUENZA VACCINE  08/01/2022      Immunization " History   Administered Date(s) Administered   • COVID-19 (PFIZER) PURPLE CAP 08/31/2021, 09/21/2021   • FluLaval/Fluzone >6mos 10/20/2014   • HPV Quadrivalent 10/20/2014, 01/26/2015   • Hepatitis A 02/26/2018, 11/09/2018   • Hpv9 09/24/2015   • Influenza TIV (IM) 11/09/2018   • Meningococcal Conjugate 09/07/2017

## 2023-03-06 ENCOUNTER — TELEPHONE (OUTPATIENT)
Dept: GASTROENTEROLOGY | Facility: CLINIC | Age: 23
End: 2023-03-06
Payer: COMMERCIAL

## 2023-03-06 NOTE — TELEPHONE ENCOUNTER
Left a message with patient to return a call to our office in regards to switching her appointment from [facility] to [facility] in [place]

## 2023-04-10 ENCOUNTER — OFFICE VISIT (OUTPATIENT)
Dept: GASTROENTEROLOGY | Facility: CLINIC | Age: 23
End: 2023-04-10
Payer: COMMERCIAL

## 2023-04-10 VITALS
HEIGHT: 71 IN | BODY MASS INDEX: 25.7 KG/M2 | SYSTOLIC BLOOD PRESSURE: 132 MMHG | HEART RATE: 80 BPM | WEIGHT: 183.6 LBS | DIASTOLIC BLOOD PRESSURE: 81 MMHG | OXYGEN SATURATION: 98 %

## 2023-04-10 DIAGNOSIS — R93.2 ABNORMAL CT SCAN, LIVER: ICD-10-CM

## 2023-04-10 DIAGNOSIS — R10.10 PAIN OF UPPER ABDOMEN: ICD-10-CM

## 2023-04-10 DIAGNOSIS — K76.9 LIVER LESION: Primary | ICD-10-CM

## 2023-04-10 PROCEDURE — 99204 OFFICE O/P NEW MOD 45 MIN: CPT | Performed by: NURSE PRACTITIONER

## 2023-04-10 RX ORDER — CLINDAMYCIN PHOSPHATE AND BENZOYL PEROXIDE 10; 50 MG/G; MG/G
GEL TOPICAL 2 TIMES DAILY
COMMUNITY
Start: 2023-02-14

## 2023-04-10 RX ORDER — TRETINOIN 0.5 MG/G
1 CREAM TOPICAL NIGHTLY
COMMUNITY
Start: 2023-02-13

## 2023-04-10 NOTE — PROGRESS NOTES
Chief Complaint        Establish Care and LIVER LESION    History of Present Illness      Lin Jimenez is a 22 y.o. female who presents to [facility] as a new patient for liver lesion.    She was initially at the ER for worsening RUQ abd pain and had gallbladder US done 9/6/2020  that showed:    CONCLUSION:  1. There could be a trace amount of sludge within the gallbladder,  however, no gallstones or biliary duct dilatation.  2. A 12 x 7 mm hypoechoic nodule within the liver which appears solid,  this could represent a hepatic hemangioma, adenoma or focal nodular  hyperplasia. Given its small size, additional dedicated MRI or CT at  this time may not be helpful. Followup ultrasound to reassess size in  3-4 months may be the best means for further evaluation.     She then had liver US done 4/2021 that showed:    IMPRESSION:  1. There is a 1.4 cm lesion in the left lobe the liver and that measures  slightly larger in all 3 planes when compared to the prior examination.  The exact etiology of the lesion is uncertain, but it is possible it  represents a hemangioma and/or adenoma. Further evaluation with a CT of  the abdomen without and with contrast per hepatic protocol is  Recommended.      She had a CT scan of the abdomen pelvis without contrast on 11/2021 that showed:  IMPRESSION:  1.3 cm lesion in the lateral segment of the liver corresponds with the  lesion on ultrasound. Differential considerations include a small focal  nodular hyperplasia, possibly adenoma, or could be a flash filling  hemangioma. If additional follow-up is needed, ultrasound evaluation  would be sufficient.    Labs done in January of this year showed completely normal LFTs.    She denies any dysphagia, reflux, N&V, diarrhea, constipation, rectal bleeding or melena. She admits her appetite is good and her weight is stable.     She had EGD 10/2020 with Dr. Ford. It was normal.     She admits her upper  "abd pain has improved with eating smaller more frequent meals and really watching what she eats.     She has never had screening colonoscopy.     She denies any significant GI FH           Results       Result Review :   The following data was reviewed by: TRACIE Miranda on 04/10/2023     CMP    CMP 1/13/23 1/13/23    0857 0857   Glucose  86   BUN  14   Creatinine  0.86   eGFR  98.1   Sodium  137   Potassium  4.3   Chloride  103   Calcium  9.3   Total Protein 7.0    Albumin 4.5    Total Bilirubin 0.3    Alkaline Phosphatase 81    AST (SGOT) 18    ALT (SGPT) 14    BUN/Creatinine Ratio  16.3   Anion Gap  7.0      Comments are available for some flowsheets but are not being displayed.           CBC    CBC 1/13/23   WBC 7.38   RBC 4.91   Hemoglobin 14.2   Hematocrit 42.6   MCV 86.8   MCH 28.9   MCHC 33.3   RDW 12.4   Platelets 251             Lipase   Lipase   Date Value Ref Range Status   09/16/2020 12 (L) 13 - 60 U/L Final       She has never had an EGD or screening colonoscopy before.    Past Medical History       History reviewed. No pertinent past medical history.    History reviewed. No pertinent surgical history.      Current Outpatient Medications:   •  Clindamycin Phos-Benzoyl Perox 1.2-5 % gel, Apply  topically to the appropriate area as directed 2 (Two) Times a Day., Disp: , Rfl:   •  spironolactone (ALDACTONE) 100 MG tablet, Take 1 tablet by mouth Daily., Disp: , Rfl:   •  tretinoin (RETIN-A) 0.05 % cream, Apply 1 application topically to the appropriate area as directed Every Night., Disp: , Rfl:      No Known Allergies    Family History   Problem Relation Age of Onset   • Hypertension Neg Hx    • Diabetes Neg Hx    • Crohn's disease Neg Hx         Social History     Social History Narrative   • Not on file       Objective       Objective     Vital Signs:   /81   Pulse 80   Ht 180.3 cm (70.98\")   Wt 83.3 kg (183 lb 9.6 oz)   SpO2 98%   BMI 25.62 kg/m²     Body mass index is 25.62 " kg/m².    Physical Exam  Constitutional:       General: She is not in acute distress.     Appearance: She is well-developed. She is not ill-appearing.   HENT:      Head: Normocephalic.   Eyes:      Pupils: Pupils are equal, round, and reactive to light.   Cardiovascular:      Rate and Rhythm: Normal rate and regular rhythm.      Heart sounds: Normal heart sounds.   Pulmonary:      Effort: Pulmonary effort is normal.      Breath sounds: Normal breath sounds.   Abdominal:      General: Bowel sounds are normal. There is no distension.      Palpations: Abdomen is soft. There is no mass.      Tenderness: There is no abdominal tenderness. There is no guarding or rebound.      Hernia: No hernia is present.   Musculoskeletal:         General: Normal range of motion.   Skin:     General: Skin is warm and dry.   Neurological:      Mental Status: She is alert and oriented to person, place, and time.   Psychiatric:         Speech: Speech normal.         Behavior: Behavior normal.         Judgment: Judgment normal.              Assessment & Plan          Assessment and Plan    Diagnoses and all orders for this visit:    1. Liver lesion (Primary)  -     CT Abdomen With Contrast; Future    2. Abnormal CT scan, liver  -     CT Abdomen With Contrast; Future    3. Pain of upper abdomen  -     CT Abdomen With Contrast; Future    Reviewed most recent imaging and lab results with her today.  She appears to have a stable liver lesion seen on most recent CT scan without contrast. Most likely appears to be a hemangioma or adenoma. Most recent LFTs were normal.  Will check a CT scan of the liver with contrast for further evaluation.  Overall she seems to be doing well currently.  No longer having any abdominal pain.  Bowels moving well.  Good appetite.  Patient to call the office with any issues.  Patient to follow-up with me in 3 months.  Patient is agreeable to the plan.            Follow Up       Follow Up   Return in 3 months (on  7/10/2023) for Liver lesion.  Patient was given instructions and counseling regarding her condition or for health maintenance advice. Please see specific information pulled into the AVS if appropriate.

## 2023-04-24 ENCOUNTER — TELEPHONE (OUTPATIENT)
Dept: GASTROENTEROLOGY | Facility: CLINIC | Age: 23
End: 2023-04-24
Payer: COMMERCIAL

## 2023-05-02 ENCOUNTER — OFFICE VISIT (OUTPATIENT)
Dept: FAMILY MEDICINE CLINIC | Age: 23
End: 2023-05-02
Payer: COMMERCIAL

## 2023-05-02 VITALS
OXYGEN SATURATION: 97 % | WEIGHT: 185.4 LBS | HEART RATE: 77 BPM | DIASTOLIC BLOOD PRESSURE: 77 MMHG | TEMPERATURE: 97.5 F | HEIGHT: 71 IN | SYSTOLIC BLOOD PRESSURE: 117 MMHG | BODY MASS INDEX: 25.96 KG/M2

## 2023-05-02 DIAGNOSIS — M54.50 ACUTE BILATERAL LOW BACK PAIN, UNSPECIFIED WHETHER SCIATICA PRESENT: ICD-10-CM

## 2023-05-02 DIAGNOSIS — S39.012A BACK STRAIN, INITIAL ENCOUNTER: Primary | ICD-10-CM

## 2023-05-02 LAB
BILIRUB BLD-MCNC: NEGATIVE MG/DL
CLARITY, POC: CLEAR
COLOR UR: YELLOW
EXPIRATION DATE: ABNORMAL
GLUCOSE UR STRIP-MCNC: NEGATIVE MG/DL
KETONES UR QL: NEGATIVE
LEUKOCYTE EST, POC: NEGATIVE
Lab: ABNORMAL
NITRITE UR-MCNC: NEGATIVE MG/ML
PH UR: 6.5 [PH] (ref 5–8)
PROT UR STRIP-MCNC: NEGATIVE MG/DL
RBC # UR STRIP: NEGATIVE /UL
SP GR UR: 1.03 (ref 1–1.03)
UROBILINOGEN UR QL: ABNORMAL

## 2023-05-02 RX ORDER — BACLOFEN 10 MG/1
5-10 TABLET ORAL NIGHTLY PRN
Qty: 10 TABLET | Refills: 0 | Status: SHIPPED | OUTPATIENT
Start: 2023-05-02

## 2023-05-02 RX ORDER — MELOXICAM 7.5 MG/1
7.5-15 TABLET ORAL DAILY
Qty: 20 TABLET | Refills: 0 | Status: SHIPPED | OUTPATIENT
Start: 2023-05-02

## 2023-05-02 NOTE — PROGRESS NOTES
Chief Complaint  Lin Jimenez presents to [facility] for Back Pain (Acute Bilateral Lower back pain, moving furniture last night may have pulled a muscle X 1 day )      Subjective     History of Present Illness  Back Pain: Lin complains of lumbar spine pain which is described as aching and tight band.    She reports that the pain has been present for 1 day.    Patient has numbness/tingling which does radiate to legs with certain positions.     She reports numbness in bilateral legs and pain in buttock (aching and tight band in character Symptoms are relieved by lying on back with knees flexed and back relaxed.    She denies fever, bladder or bowel incontinence, or weakness to the hips or legs.    Current treatment includes OTC Ibuprofen  Previous images include none        Assessment and Plan       Diagnoses and all orders for this visit:    1. Back strain, initial encounter (Primary)  Comments:  advised alternate heat and ice/ stretching after 2-3 days on NSAIDs and return if symptoms persist/ return.  consider PT   Orders:  -     baclofen (LIORESAL) 10 MG tablet; Take 0.5-1 tablets by mouth At Night As Needed for Muscle Spasms.  Dispense: 10 tablet; Refill: 0  -     meloxicam (Mobic) 7.5 MG tablet; Take 1-2 tablets by mouth Daily.  Dispense: 20 tablet; Refill: 0    2. Acute bilateral low back pain, unspecified whether sciatica present  -     POCT urinalysis dipstick, automated        Follow Up   Return if symptoms worsen or fail to improve.      New Medications Ordered This Visit   Medications   • baclofen (LIORESAL) 10 MG tablet     Sig: Take 0.5-1 tablets by mouth At Night As Needed for Muscle Spasms.     Dispense:  10 tablet     Refill:  0   • meloxicam (Mobic) 7.5 MG tablet     Sig: Take 1-2 tablets by mouth Daily.     Dispense:  20 tablet     Refill:  0       There are no discontinued medications.         Review of Systems    Objective     Vitals:    05/02/23 0935  "  BP: 117/77   BP Location: Right arm   Patient Position: Sitting   Cuff Size: Adult   Pulse: 77   Temp: 97.5 °F (36.4 °C)   TempSrc: Oral   SpO2: 97%   Weight: 84.1 kg (185 lb 6.4 oz)   Height: 180.3 cm (70.98\")     Body mass index is 25.87 kg/m².     Physical Exam  Constitutional:       General: She is not in acute distress.     Appearance: Normal appearance.   HENT:      Head: Normocephalic.   Cardiovascular:      Rate and Rhythm: Normal rate and regular rhythm.   Pulmonary:      Effort: Pulmonary effort is normal.      Breath sounds: Normal breath sounds.   Musculoskeletal:      Lumbar back: Decreased range of motion (with flexion ). Negative right straight leg raise test and negative left straight leg raise test.   Neurological:      General: No focal deficit present.      Mental Status: She is alert and oriented to person, place, and time.   Psychiatric:         Mood and Affect: Mood normal.         Behavior: Behavior normal.            Result Review   The following data was reviewed by: TRACIE Sorensen on 05/02/2023:  POCT urinalysis dipstick, automated (05/02/2023 09:46)                    No Known Allergies   No past medical history on file.  Current Outpatient Medications   Medication Sig Dispense Refill   • Clindamycin Phos-Benzoyl Perox 1.2-5 % gel Apply  topically to the appropriate area as directed 2 (Two) Times a Day.     • Fexofenadine HCl (ALLEGRA PO) Take  by mouth Daily.     • spironolactone (ALDACTONE) 100 MG tablet Take 1 tablet by mouth Daily.     • tretinoin (RETIN-A) 0.05 % cream Apply 1 application topically to the appropriate area as directed Every Night.     • baclofen (LIORESAL) 10 MG tablet Take 0.5-1 tablets by mouth At Night As Needed for Muscle Spasms. 10 tablet 0   • meloxicam (Mobic) 7.5 MG tablet Take 1-2 tablets by mouth Daily. 20 tablet 0     No current facility-administered medications for this visit.     No past surgical history on file.   Health Maintenance Due   Topic " Date Due   • TDAP/TD VACCINES (1 - Tdap) Never done   • HEPATITIS C SCREENING  Never done   • ANNUAL PHYSICAL  Never done   • PAP SMEAR  Never done      Immunization History   Administered Date(s) Administered   • COVID-19 (PFIZER) Purple Cap Monovalent 08/31/2021, 09/21/2021   • FluLaval/Fluzone >6mos 10/20/2014   • HPV Quadrivalent 10/20/2014, 01/26/2015   • Hepatitis A 02/26/2018, 11/09/2018   • Hpv9 09/24/2015   • Influenza TIV (IM) 11/09/2018   • Meningococcal Conjugate 09/07/2017         Part of this note may be an electronic transcription/translation of spoken language to printed   text using the Dragon Dictation System.      lEayne Sprague, APRN